# Patient Record
Sex: FEMALE | Race: WHITE | NOT HISPANIC OR LATINO | Employment: FULL TIME | URBAN - METROPOLITAN AREA
[De-identification: names, ages, dates, MRNs, and addresses within clinical notes are randomized per-mention and may not be internally consistent; named-entity substitution may affect disease eponyms.]

---

## 2023-01-08 ENCOUNTER — TRANSFERRED RECORDS (OUTPATIENT)
Dept: HEALTH INFORMATION MANAGEMENT | Facility: CLINIC | Age: 28
End: 2023-01-08

## 2023-03-16 ENCOUNTER — TRANSFERRED RECORDS (OUTPATIENT)
Dept: HEALTH INFORMATION MANAGEMENT | Facility: CLINIC | Age: 28
End: 2023-03-16

## 2023-03-29 ENCOUNTER — TRANSFERRED RECORDS (OUTPATIENT)
Dept: HEALTH INFORMATION MANAGEMENT | Facility: CLINIC | Age: 28
End: 2023-03-29

## 2023-05-04 ENCOUNTER — TRANSFERRED RECORDS (OUTPATIENT)
Dept: HEALTH INFORMATION MANAGEMENT | Facility: CLINIC | Age: 28
End: 2023-05-04

## 2024-09-06 ENCOUNTER — OFFICE VISIT (OUTPATIENT)
Dept: FAMILY MEDICINE | Facility: CLINIC | Age: 29
End: 2024-09-06
Payer: COMMERCIAL

## 2024-09-06 VITALS
TEMPERATURE: 97.1 F | BODY MASS INDEX: 26.71 KG/M2 | HEART RATE: 79 BPM | RESPIRATION RATE: 11 BRPM | WEIGHT: 160.3 LBS | OXYGEN SATURATION: 100 % | SYSTOLIC BLOOD PRESSURE: 118 MMHG | HEIGHT: 65 IN | DIASTOLIC BLOOD PRESSURE: 76 MMHG

## 2024-09-06 DIAGNOSIS — F50.9 EATING DISORDER, UNSPECIFIED TYPE: ICD-10-CM

## 2024-09-06 DIAGNOSIS — F33.2 SEVERE RECURRENT MAJOR DEPRESSION WITHOUT PSYCHOTIC FEATURES (H): ICD-10-CM

## 2024-09-06 DIAGNOSIS — K21.9 GASTROESOPHAGEAL REFLUX DISEASE WITHOUT ESOPHAGITIS: Primary | ICD-10-CM

## 2024-09-06 PROCEDURE — 99214 OFFICE O/P EST MOD 30 MIN: CPT | Performed by: FAMILY MEDICINE

## 2024-09-06 PROCEDURE — 96127 BRIEF EMOTIONAL/BEHAV ASSMT: CPT | Performed by: FAMILY MEDICINE

## 2024-09-06 RX ORDER — ESCITALOPRAM OXALATE 10 MG/1
10 TABLET ORAL DAILY
Qty: 90 TABLET | Refills: 0 | Status: SHIPPED | OUTPATIENT
Start: 2024-09-06

## 2024-09-06 RX ORDER — TESTOSTERONE 20.25 MG/1.25G
2 GEL TOPICAL DAILY
COMMUNITY
Start: 2024-08-29

## 2024-09-06 RX ORDER — ESOMEPRAZOLE MAGNESIUM 40 MG/1
40 CAPSULE, DELAYED RELEASE ORAL
Qty: 90 CAPSULE | Refills: 0 | Status: SHIPPED | OUTPATIENT
Start: 2024-09-06

## 2024-09-06 ASSESSMENT — PAIN SCALES - GENERAL: PAINLEVEL: MILD PAIN (3)

## 2024-09-06 ASSESSMENT — PATIENT HEALTH QUESTIONNAIRE - PHQ9
10. IF YOU CHECKED OFF ANY PROBLEMS, HOW DIFFICULT HAVE THESE PROBLEMS MADE IT FOR YOU TO DO YOUR WORK, TAKE CARE OF THINGS AT HOME, OR GET ALONG WITH OTHER PEOPLE: EXTREMELY DIFFICULT
SUM OF ALL RESPONSES TO PHQ QUESTIONS 1-9: 21
SUM OF ALL RESPONSES TO PHQ QUESTIONS 1-9: 21

## 2024-09-06 NOTE — PROGRESS NOTES
"Preventive Care Visit  [facility]  ROSA GARCIA MD, Family Medicine  Sep 6, 2024    Assessment & Plan   Gastroesophageal reflux disease without esophagitis  - esomeprazole (NEXIUM) 40 MG DR capsule  Dispense: 90 capsule; Refill: 0  Given [email or web address] heartburn information for recommendations, such as elevating the head of the bed by 6-8 inches.    Severe recurrent major depression without psychotic features (H)  - escitalopram (LEXAPRO) 10 MG tablet  Dispense: 90 tablet; Refill: 0  - Adult Mental Health [facility] Referral    Eating disorder, unspecified type  Offered referral to a dietician. She declines at this time.  They will try to eat the variety that is palatable to her.    They agree to return in 3-6 weeks for preventative care and recheck.  BMI  Estimated body mass index is 26.68 kg/m  as calculated from the following:    Height as of this encounter: 1.651 m (5' 5\").    Weight as of this encounter: 72.7 kg (160 lb 4.8 oz).         9/6/2024     1:40 PM   PHQ   PHQ-9 Total Score 21   Q9: Thoughts of better off dead/self-harm past 2 weeks More than half the days   F/U: Thoughts of suicide or self-harm No   F/U: Safety concerns No     Follow Up Actions Taken  Crisis resource information provided in the After Visit Summary  Patient to follow up with PCP.  Clinic staff to schedule appointment if able.    Discussed the following ways the patient can remain in a safe environment:  be around others and they and their partner say there is no alcohol, drugs or guns in their home.  Richard Bustillo is a 29 year old, presenting for the following:  Physical and Establish Care        9/6/2024     1:54 PM   Additional Questions   Accompanied by ham      Health Care Directive  Patient does not have a Health Care Directive or Living Will: Discussed advance care planning with patient; information given to patient to review.    Attempted suicide over a decade ago. Tried pills, tried cutting. " Parents didn't find out. At 12 yo spent a weekend in a psych bashir after saying he was suicidal.  Self diagnoses: Social anxiety and PTSD and neurodivergent. Interested in counseling.  Stress just got a job. This is the second full time job he has had. Likes the job, but it is mentally exhausted at the end. 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 But all his energy goes to work.  Struggles with sleep. Has blackout curtains, but needs sound to sleep. Hard to fall asleep if he knows he has to get up in the morning. Tiny amount of melatonin.  Diagnosed with GERD. Was on a strong antacid. Using an OTC esmoprazole. Regurgitates. Limits foods and when he eats.  Struggles to eat vegetables. Bitter and earthy things are hard to eat. Eats corn. Likes peas and green beans. It is a taste and texture issue. Eats balogne sandwiches on white bread without condiments daily. Can drink milk. Does not drink soda often. Drinks water.          9/6/2024   General Health   How would you rate your overall physical health? (!) POOR   Feel stress (tense, anxious, or unable to sleep) Very much      (!) STRESS CONCERN      9/6/2024   Nutrition   Three or more servings of calcium each day? (!) NO   Diet: Regular (no restrictions)   How many servings of fruit and vegetables per day? (!) 0-1   How many sweetened beverages each day? 0-1          9/6/2024   Exercise   Days per week of moderate/strenous exercise 5 days   Average minutes spent exercising at this level Patient declined          9/6/2024   Social Factors   Frequency of gathering with friends or relatives Never   Worry food won't last until get money to buy more No   Food not last or not have enough money for food? No   Do you have housing? (Housing is defined as stable permanent housing and does not include staying ouside in a car, in a tent, in an abandoned building, in an overnight shelter, or couch-surfing.) Yes   Are you worried about losing your housing? No   Lack  "of transportation? No   Unable to get utilities (heat,electricity)? No      (!) SOCIAL CONNECTIONS CONCERN      9/6/2024   Dental   Dentist two times every year? (!) NO          9/6/2024   TB Screening   Were you born outside of the [place]? No      Today's PHQ-9 Score:       9/6/2024     1:40 PM   PHQ-9 SCORE   PHQ-9 Total Score MyChart 21 (Severe depression)   PHQ-9 Total Score 21         9/6/2024   Substance Use   Alcohol more than 3/day or more than 7/wk No   Do you use any other substances recreationally? No      Social History     Tobacco Use    Smoking status: Never    Smokeless tobacco: Never   Vaping Use    Vaping status: Never Used              9/6/2024   STI Screening   New sexual partner(s) since last STI/HIV test? No          9/6/2024   Contraception/Family Planning   Questions about contraception or family planning No       Reviewed and updated as needed this visit by Provider                History reviewed. No pertinent past medical history.  History reviewed. No pertinent surgical history.  OB History   No obstetric history on file.     Current Outpatient Medications   Medication Sig Dispense Refill    escitalopram (LEXAPRO) 10 MG tablet Take 1 tablet (10 mg) by mouth daily. 90 tablet 0    esomeprazole (NEXIUM) 40 MG DR capsule Take 1 capsule (40 mg) by mouth every morning (before breakfast). Take 30-60 minutes before eating. 90 capsule 0    Testosterone 1.62 % GEL Apply 2 Pump topically daily.       Allergies   Allergen Reactions    Penicillins Other (See Comments)      Objective    Exam  /76 (BP Location: Left arm, Patient Position: Sitting, Cuff Size: Adult Large)   Pulse 79   Temp 97.1  F (36.2  C)   Resp 11   Ht 1.651 m (5' 5\")   Wt 72.7 kg (160 lb 4.8 oz)   LMP  (LMP Unknown)   SpO2 100%   BMI 26.68 kg/m     Estimated body mass index is 26.68 kg/m  as calculated from the following:    Height as of this encounter: 1.651 m (5' 5\").    Weight as of this encounter: 72.7 kg (160 lb 4.8 " oz).    Physical Exam  GENERAL: healthy, alert and no distress  EYES: grossly normal to inspection, and conjunctivae and sclerae normal  RESP: breathing unlabored, no cough or throat clearing.  MS: no gross musculoskeletal defects noted.  SKIN: no suspicious lesions or rashes visible  NEURO: Normal strength and tone, mentation intact and speech normal  PSYCH: mentation appears normal, affect normal/bright   Signed Electronically by: ROSA GARICA MD

## 2024-10-06 ENCOUNTER — HEALTH MAINTENANCE LETTER (OUTPATIENT)
Age: 29
End: 2024-10-06

## 2024-10-18 ENCOUNTER — OFFICE VISIT (OUTPATIENT)
Dept: FAMILY MEDICINE | Facility: CLINIC | Age: 29
End: 2024-10-18
Payer: COMMERCIAL

## 2024-10-18 VITALS
HEART RATE: 74 BPM | OXYGEN SATURATION: 99 % | WEIGHT: 161.4 LBS | RESPIRATION RATE: 16 BRPM | HEIGHT: 65 IN | TEMPERATURE: 98.3 F | BODY MASS INDEX: 26.89 KG/M2 | SYSTOLIC BLOOD PRESSURE: 120 MMHG | DIASTOLIC BLOOD PRESSURE: 54 MMHG

## 2024-10-18 DIAGNOSIS — F33.2 SEVERE RECURRENT MAJOR DEPRESSION WITHOUT PSYCHOTIC FEATURES (H): Primary | ICD-10-CM

## 2024-10-18 DIAGNOSIS — Z13.29 SCREENING FOR ENDOCRINE, NUTRITIONAL, METABOLIC AND IMMUNITY DISORDER: ICD-10-CM

## 2024-10-18 DIAGNOSIS — Z11.59 NEED FOR HEPATITIS C SCREENING TEST: ICD-10-CM

## 2024-10-18 DIAGNOSIS — Z13.228 SCREENING FOR ENDOCRINE, NUTRITIONAL, METABOLIC AND IMMUNITY DISORDER: ICD-10-CM

## 2024-10-18 DIAGNOSIS — Z11.4 SCREENING FOR HIV (HUMAN IMMUNODEFICIENCY VIRUS): ICD-10-CM

## 2024-10-18 DIAGNOSIS — F84.0 AUTISM: ICD-10-CM

## 2024-10-18 DIAGNOSIS — Z13.0 SCREENING FOR ENDOCRINE, NUTRITIONAL, METABOLIC AND IMMUNITY DISORDER: ICD-10-CM

## 2024-10-18 DIAGNOSIS — Z13.21 SCREENING FOR ENDOCRINE, NUTRITIONAL, METABOLIC AND IMMUNITY DISORDER: ICD-10-CM

## 2024-10-18 LAB
ALBUMIN SERPL BCG-MCNC: 4.4 G/DL (ref 3.5–5.2)
ALP SERPL-CCNC: 105 U/L (ref 40–150)
ALT SERPL W P-5'-P-CCNC: 27 U/L (ref 0–70)
ANION GAP SERPL CALCULATED.3IONS-SCNC: 9 MMOL/L (ref 7–15)
AST SERPL W P-5'-P-CCNC: 26 U/L (ref 0–45)
BASOPHILS # BLD AUTO: 0 10E3/UL (ref 0–0.2)
BASOPHILS NFR BLD AUTO: 0 %
BILIRUB SERPL-MCNC: 0.3 MG/DL
BUN SERPL-MCNC: 10.9 MG/DL (ref 6–20)
CALCIUM SERPL-MCNC: 9.6 MG/DL (ref 8.8–10.4)
CHLORIDE SERPL-SCNC: 107 MMOL/L (ref 98–107)
CHOLEST SERPL-MCNC: 162 MG/DL
CREAT SERPL-MCNC: 0.8 MG/DL (ref 0.51–1.17)
EGFRCR SERPLBLD CKD-EPI 2021: >90 ML/MIN/1.73M2
EOSINOPHIL # BLD AUTO: 0.2 10E3/UL (ref 0–0.7)
EOSINOPHIL NFR BLD AUTO: 2 %
ERYTHROCYTE [DISTWIDTH] IN BLOOD BY AUTOMATED COUNT: 12.6 % (ref 10–15)
FASTING STATUS PATIENT QL REPORTED: NORMAL
FASTING STATUS PATIENT QL REPORTED: NORMAL
GLUCOSE SERPL-MCNC: 90 MG/DL (ref 70–99)
HBV SURFACE AB SERPL IA-ACNC: 118 M[IU]/ML
HBV SURFACE AB SERPL IA-ACNC: REACTIVE M[IU]/ML
HCO3 SERPL-SCNC: 25 MMOL/L (ref 22–29)
HCT VFR BLD AUTO: 44.7 % (ref 35–53)
HDLC SERPL-MCNC: 44 MG/DL
HGB BLD-MCNC: 14.3 G/DL (ref 11.7–17.7)
IMM GRANULOCYTES # BLD: 0 10E3/UL
IMM GRANULOCYTES NFR BLD: 0 %
LDLC SERPL CALC-MCNC: 92 MG/DL
LYMPHOCYTES # BLD AUTO: 3.6 10E3/UL (ref 0.8–5.3)
LYMPHOCYTES NFR BLD AUTO: 34 %
MCH RBC QN AUTO: 29.4 PG (ref 26.5–33)
MCHC RBC AUTO-ENTMCNC: 32 G/DL (ref 31.5–36.5)
MCV RBC AUTO: 92 FL (ref 78–100)
MONOCYTES # BLD AUTO: 1.1 10E3/UL (ref 0–1.3)
MONOCYTES NFR BLD AUTO: 10 %
NEUTROPHILS # BLD AUTO: 5.6 10E3/UL (ref 1.6–8.3)
NEUTROPHILS NFR BLD AUTO: 54 %
NONHDLC SERPL-MCNC: 118 MG/DL
PLATELET # BLD AUTO: 255 10E3/UL (ref 150–450)
POTASSIUM SERPL-SCNC: 3.9 MMOL/L (ref 3.4–5.3)
PROT SERPL-MCNC: 6.9 G/DL (ref 6.4–8.3)
RBC # BLD AUTO: 4.87 10E6/UL (ref 3.8–5.9)
SODIUM SERPL-SCNC: 141 MMOL/L (ref 135–145)
T4 FREE SERPL-MCNC: 1.11 NG/DL (ref 0.9–1.7)
TRIGL SERPL-MCNC: 129 MG/DL
TSH SERPL DL<=0.005 MIU/L-ACNC: 5.91 UIU/ML (ref 0.3–4.2)
WBC # BLD AUTO: 10.4 10E3/UL (ref 4–11)

## 2024-10-18 PROCEDURE — 84443 ASSAY THYROID STIM HORMONE: CPT | Performed by: FAMILY MEDICINE

## 2024-10-18 PROCEDURE — 99214 OFFICE O/P EST MOD 30 MIN: CPT | Mod: 25 | Performed by: FAMILY MEDICINE

## 2024-10-18 PROCEDURE — 90471 IMMUNIZATION ADMIN: CPT | Performed by: FAMILY MEDICINE

## 2024-10-18 PROCEDURE — 85025 COMPLETE CBC W/AUTO DIFF WBC: CPT | Performed by: FAMILY MEDICINE

## 2024-10-18 PROCEDURE — 80053 COMPREHEN METABOLIC PANEL: CPT | Performed by: FAMILY MEDICINE

## 2024-10-18 PROCEDURE — 91320 SARSCV2 VAC 30MCG TRS-SUC IM: CPT | Performed by: FAMILY MEDICINE

## 2024-10-18 PROCEDURE — 90715 TDAP VACCINE 7 YRS/> IM: CPT | Performed by: FAMILY MEDICINE

## 2024-10-18 PROCEDURE — 90472 IMMUNIZATION ADMIN EACH ADD: CPT | Performed by: FAMILY MEDICINE

## 2024-10-18 PROCEDURE — 80061 LIPID PANEL: CPT | Performed by: FAMILY MEDICINE

## 2024-10-18 PROCEDURE — 84439 ASSAY OF FREE THYROXINE: CPT | Performed by: FAMILY MEDICINE

## 2024-10-18 PROCEDURE — 90651 9VHPV VACCINE 2/3 DOSE IM: CPT | Performed by: FAMILY MEDICINE

## 2024-10-18 PROCEDURE — 86706 HEP B SURFACE ANTIBODY: CPT | Performed by: FAMILY MEDICINE

## 2024-10-18 PROCEDURE — 36415 COLL VENOUS BLD VENIPUNCTURE: CPT | Performed by: FAMILY MEDICINE

## 2024-10-18 PROCEDURE — 90480 ADMN SARSCOV2 VAC 1/ONLY CMP: CPT | Performed by: FAMILY MEDICINE

## 2024-10-18 RX ORDER — VENLAFAXINE HYDROCHLORIDE 37.5 MG/1
37.5 CAPSULE, EXTENDED RELEASE ORAL DAILY
Qty: 30 CAPSULE | Refills: 0 | Status: SHIPPED | OUTPATIENT
Start: 2024-10-18

## 2024-10-18 ASSESSMENT — ANXIETY QUESTIONNAIRES
4. TROUBLE RELAXING: NEARLY EVERY DAY
3. WORRYING TOO MUCH ABOUT DIFFERENT THINGS: NEARLY EVERY DAY
7. FEELING AFRAID AS IF SOMETHING AWFUL MIGHT HAPPEN: SEVERAL DAYS
6. BECOMING EASILY ANNOYED OR IRRITABLE: MORE THAN HALF THE DAYS
GAD7 TOTAL SCORE: 16
8. IF YOU CHECKED OFF ANY PROBLEMS, HOW DIFFICULT HAVE THESE MADE IT FOR YOU TO DO YOUR WORK, TAKE CARE OF THINGS AT HOME, OR GET ALONG WITH OTHER PEOPLE?: EXTREMELY DIFFICULT
1. FEELING NERVOUS, ANXIOUS, OR ON EDGE: NEARLY EVERY DAY
GAD7 TOTAL SCORE: 16
IF YOU CHECKED OFF ANY PROBLEMS ON THIS QUESTIONNAIRE, HOW DIFFICULT HAVE THESE PROBLEMS MADE IT FOR YOU TO DO YOUR WORK, TAKE CARE OF THINGS AT HOME, OR GET ALONG WITH OTHER PEOPLE: EXTREMELY DIFFICULT
5. BEING SO RESTLESS THAT IT IS HARD TO SIT STILL: SEVERAL DAYS
7. FEELING AFRAID AS IF SOMETHING AWFUL MIGHT HAPPEN: SEVERAL DAYS
GAD7 TOTAL SCORE: 16
2. NOT BEING ABLE TO STOP OR CONTROL WORRYING: NEARLY EVERY DAY

## 2024-10-18 NOTE — PROGRESS NOTES
"Assessment & Plan   The longitudinal plan of care for the diagnosis(es)/condition(s) as documented were addressed during this visit. Due to the added complexity in care, I will continue to support Keny in the subsequent management and with ongoing continuity of care.  Severe recurrent major depression without psychotic features (H)  Side effects to escitalopram  - venlafaxine (EFFEXOR XR) 37.5 MG 24 hr capsule  Dispense: 30 capsule; Refill: 0    Autism  Per drugs.com, venlafaxine was preferred by patients.  - venlafaxine (EFFEXOR XR) 37.5 MG 24 hr capsule  Dispense: 30 capsule; Refill: 0    Screening for endocrine, nutritional, metabolic and immunity disorder  - Comprehensive metabolic panel (BMP + Alb, Alk Phos, ALT, AST, Total. Bili, TP)  - CBC with platelets and differential  - TSH with free T4 reflex  - Lipid Profile (Chol, Trig, HDL, LDL calc)  - Hepatitis B Surface Antibody  - T4 free    BMI  Estimated body mass index is 26.86 kg/m  as calculated from the following:    Height as of this encounter: 1.651 m (5' 5\").    Weight as of this encounter: 73.2 kg (161 lb 6.4 oz).     Subjective   Keny is a 29 year old, presenting for the following health issues:  Follow Up      10/18/2024    12:13 PM   Additional Questions   Roomed by LUANN Roberto     Took it for two days and stayed up for three days. The anxiety and depression was worse than ever. When she stopped it, she had a crashing depression. Better now.  She had never been on any antidepressants before.  It has been a long time since she stayed up for days.  \"To me it is normal. It is a lot of anxiety. Social anxiety, generalized anxiety.\" Does not ask for what is needed. Partner states they can't do things.    History of Present Illness       Mental Health Follow-up:  Patient presents to follow-up on Depression & Anxiety.Patient's depression since last visit has been:  Worse  The patient is not having other symptoms associated with depression.  Patient's " "anxiety since last visit has been:  Worse  The patient is not having other symptoms associated with anxiety.  Any significant life events: other (Actually all-- partner's health, financial and job concerns.)  Patient is feeling anxious or having panic attacks.  Patient has no concerns about alcohol or drug use.    Keny eats 0-1 servings of fruits and vegetables daily.Keny consumes 0 sweetened beverage(s) daily.Keny exercises with enough effort to increase Keny's heart rate 60 or more minutes per day.  Keny exercises with enough effort to increase Keny's heart rate 5 days per week.   Keny is taking medications regularly.           Objective    /54 (BP Location: Left arm, Patient Position: Sitting, Cuff Size: Adult Regular)   Pulse 74   Temp 98.3  F (36.8  C) (Oral)   Resp 16   Ht 1.651 m (5' 5\")   Wt 73.2 kg (161 lb 6.4 oz)   LMP  (LMP Unknown)   SpO2 99%   BMI 26.86 kg/m    Body mass index is 26.86 kg/m .  Physical Exam               Signed Electronically by: ROSA GARCIA MD    "

## 2024-11-25 ENCOUNTER — OFFICE VISIT (OUTPATIENT)
Dept: FAMILY MEDICINE | Facility: CLINIC | Age: 29
End: 2024-11-25
Payer: COMMERCIAL

## 2024-11-25 ENCOUNTER — ANCILLARY PROCEDURE (OUTPATIENT)
Dept: GENERAL RADIOLOGY | Facility: CLINIC | Age: 29
End: 2024-11-25
Payer: COMMERCIAL

## 2024-11-25 VITALS
OXYGEN SATURATION: 100 % | BODY MASS INDEX: 27.49 KG/M2 | WEIGHT: 165 LBS | HEART RATE: 73 BPM | HEIGHT: 65 IN | TEMPERATURE: 98 F | SYSTOLIC BLOOD PRESSURE: 112 MMHG | DIASTOLIC BLOOD PRESSURE: 66 MMHG | RESPIRATION RATE: 14 BRPM

## 2024-11-25 DIAGNOSIS — G89.29 CHRONIC PAIN OF RIGHT LOWER EXTREMITY: ICD-10-CM

## 2024-11-25 DIAGNOSIS — F33.2 SEVERE RECURRENT MAJOR DEPRESSION WITHOUT PSYCHOTIC FEATURES (H): ICD-10-CM

## 2024-11-25 DIAGNOSIS — K21.9 GASTROESOPHAGEAL REFLUX DISEASE WITHOUT ESOPHAGITIS: ICD-10-CM

## 2024-11-25 DIAGNOSIS — M79.604 CHRONIC PAIN OF RIGHT LOWER EXTREMITY: ICD-10-CM

## 2024-11-25 DIAGNOSIS — Z11.59 NEED FOR HEPATITIS C SCREENING TEST: ICD-10-CM

## 2024-11-25 DIAGNOSIS — F50.9 EATING DISORDER, UNSPECIFIED TYPE: ICD-10-CM

## 2024-11-25 DIAGNOSIS — Z11.4 SCREENING FOR HIV (HUMAN IMMUNODEFICIENCY VIRUS): Primary | ICD-10-CM

## 2024-11-25 DIAGNOSIS — R79.89 ABNORMAL TSH: ICD-10-CM

## 2024-11-25 PROCEDURE — 90472 IMMUNIZATION ADMIN EACH ADD: CPT | Performed by: FAMILY MEDICINE

## 2024-11-25 PROCEDURE — 99214 OFFICE O/P EST MOD 30 MIN: CPT | Mod: 25 | Performed by: FAMILY MEDICINE

## 2024-11-25 PROCEDURE — 90471 IMMUNIZATION ADMIN: CPT | Performed by: FAMILY MEDICINE

## 2024-11-25 PROCEDURE — 90651 9VHPV VACCINE 2/3 DOSE IM: CPT | Performed by: FAMILY MEDICINE

## 2024-11-25 PROCEDURE — 90656 IIV3 VACC NO PRSV 0.5 ML IM: CPT | Performed by: FAMILY MEDICINE

## 2024-11-25 PROCEDURE — 96127 BRIEF EMOTIONAL/BEHAV ASSMT: CPT | Performed by: FAMILY MEDICINE

## 2024-11-25 PROCEDURE — 73562 X-RAY EXAM OF KNEE 3: CPT | Mod: TC | Performed by: RADIOLOGY

## 2024-11-25 RX ORDER — ESOMEPRAZOLE MAGNESIUM 40 MG/1
40 CAPSULE, DELAYED RELEASE ORAL 2 TIMES DAILY
Qty: 90 CAPSULE | Refills: 0 | Status: SHIPPED | OUTPATIENT
Start: 2024-11-25

## 2024-11-25 ASSESSMENT — PATIENT HEALTH QUESTIONNAIRE - PHQ9
10. IF YOU CHECKED OFF ANY PROBLEMS, HOW DIFFICULT HAVE THESE PROBLEMS MADE IT FOR YOU TO DO YOUR WORK, TAKE CARE OF THINGS AT HOME, OR GET ALONG WITH OTHER PEOPLE: NOT DIFFICULT AT ALL
SUM OF ALL RESPONSES TO PHQ QUESTIONS 1-9: 0
SUM OF ALL RESPONSES TO PHQ QUESTIONS 1-9: 0

## 2024-11-25 NOTE — PROGRESS NOTES
"Assessment & Plan   Chronic pain of right lower extremity  - XR Knee Right 3 Views  - Physical Therapy  Referral    Abnormal TSH  - TSH with free T4 reflex    Gastroesophageal reflux disease without esophagitis  Not improving on Nexium. Includes cough and chest pain.  - esomeprazole (NEXIUM) 40 MG DR capsule  Dispense: 90 capsule; Refill: 0  - Adult GI  Referral - Consult Only    Severe recurrent major depression without psychotic features (H)  Will restart venlafaxine    Eating disorder, unspecified type  Eating daily.  Weight is up 4#.  The longitudinal plan of care for the diagnosis(es)/condition(s) as documented were addressed during this visit. Due to the added complexity in care, I will continue to support Keny in the subsequent management and with ongoing continuity of care.  BMI  Estimated body mass index is 27.46 kg/m  as calculated from the following:    Height as of this encounter: 1.651 m (5' 5\").    Weight as of this encounter: 74.8 kg (165 lb).     Subjective   Keny is a 29 year old, presenting for the following health issues:  Recheck Medication      11/25/2024     9:48 AM   Additional Questions   Roomed by Enrike GARAY RN   Accompanied by Significant other     Could not afford venlafaxine until  a few days ago.  Still has a persistent cough, reflux into the mouth. The stomach and chest pain is not much different. A consistent burning and chest pain. Eating worsens the cough.  They have had pain in the right knee for a couple years, since a fall onto both knees. Now there is pain in the quadriceps and shin also at times. The knee feels like it might give out at times. They are an  at [employer], so lots of kneeling and stairs and climbing. The pain was there before, but worse now. They don't use knee pads or cushions.     Review of Systems  Constitutional, HEENT, cardiovascular, pulmonary, GI, , musculoskeletal, neuro, skin, endocrine and psych systems are " "negative, except as otherwise noted.      Objective    /66 (BP Location: Left arm, Patient Position: Sitting, Cuff Size: Adult Regular)   Pulse 73   Temp 98  F (36.7  C) (Oral)   Resp 14   Ht 1.651 m (5' 5\")   Wt 74.8 kg (165 lb)   SpO2 100%   BMI 27.46 kg/m    Body mass index is 27.46 kg/m .  Physical Exam   GENERAL: alert and no distress  EYES: Eyes grossly normal to inspection, PERRL and conjunctivae and sclerae normal  HENT: ear canals and TM's normal, nose and mouth without ulcers or lesions  NECK: no adenopathy, no asymmetry, masses, or scars  RESP: lungs clear to auscultation - no rales, rhonchi or wheezes  CV: regular rate and rhythm, normal S1 S2, no S3 or S4, no murmur, click or rub, no peripheral edema  ABDOMEN: soft, nontender, no hepatosplenomegaly, no masses and bowel sounds normal  MS: no gross musculoskeletal defects noted, no edema  SKIN: no suspicious lesions or rashes  NEURO: Normal strength and tone, mentation intact and speech normal  PSYCH: mentation appears normal, affect normal/bright    Office Visit on 10/18/2024   Component Date Value Ref Range Status    Sodium 10/18/2024 141  135 - 145 mmol/L Final    Potassium 10/18/2024 3.9  3.4 - 5.3 mmol/L Final    Carbon Dioxide (CO2) 10/18/2024 25  22 - 29 mmol/L Final    Anion Gap 10/18/2024 9  7 - 15 mmol/L Final    Urea Nitrogen 10/18/2024 10.9  6.0 - 20.0 mg/dL Final    Creatinine 10/18/2024 0.80  0.51 - 1.17 mg/dL Final    Male and Female  0-2 Months    0.31-0.88 mg/dL  2-12 Months   0.16-0.39 mg/dL  1-2 Years     0.18-0.35 mg/dL  3-4 Years     0.26-0.42 mg/dL  5-6 Years     0.29-0.47 mg/dL  7-8 Years     0.34-0.53 mg/dL  9-10 Years    0.33-0.64 mg/dL  11-12 Years   0.44-0.68 mg/dL  13-14 Years   0.46-0.77 mg/dL    Female  15 Years and older  0.51-0.95 mg/dL    Male  15 Years and older  0.67-1.17 mg/dL        GFR Estimate 10/18/2024 >90  >60 mL/min/1.73m2 Final    The generation of the estimated GFR is currently based on binary male " "or female sex. If the electronic health record information indicates another gender identity or if Legal Sex is recorded as \"Unknown\", GFR estimates are not automatically calculated, and application of GFR equations or a direct GFR measurement should be considered according to the individual's appropriate clinical context.  eGFR calculated using 2021 CKD-EPI equation.    Calcium 10/18/2024 9.6  8.8 - 10.4 mg/dL Final    Reference intervals for this test were updated on 7/16/2024 to reflect our healthy population more accurately. There may be differences in the flagging of prior results with similar values performed with this method. Those prior results can be interpreted in the context of the updated reference intervals.    Chloride 10/18/2024 107  98 - 107 mmol/L Final    Glucose 10/18/2024 90  70 - 99 mg/dL Final    Alkaline Phosphatase 10/18/2024 105  40 - 150 U/L Final    Female:    0-15 days     U/L  15d-1 year   122-469 U/L  1-10 years   142-335 U/L  10-13 years  129-417 U/L  13-15 years   U/L  15-17 years   U/L  17-19 years  45-87 U/L  19 years and older   U/L      Male:  0-15 days     U/L  15d-1 year   122-469 U/L  1-10 years   142-335 U/L  10-13 years  129-417 U/L  13-15 years  116-468 U/L  15-17 years   U/L  17-19 years   U/L  19 years and older   U/L      AST 10/18/2024 26  0 - 45 U/L Final    ALT 10/18/2024 27  0 - 70 U/L Final    Female   All ages       0-50 U/L     Male   0-20 Years     0-50 U/L  20-Unsp. Years 0-70 U/L        Protein Total 10/18/2024 6.9  6.4 - 8.3 g/dL Final    Albumin 10/18/2024 4.4  3.5 - 5.2 g/dL Final    Bilirubin Total 10/18/2024 0.3  <=1.2 mg/dL Final    Patient Fasting > 8hrs? 10/18/2024 Unknown   Final    TSH 10/18/2024 5.91 (H)  0.30 - 4.20 uIU/mL Final    Cholesterol 10/18/2024 162  <200 mg/dL Final    Triglycerides 10/18/2024 129  <150 mg/dL Final    Direct Measure HDL 10/18/2024 44  >=40 mg/dL Final    LDL Cholesterol " Calculated 10/18/2024 92  <100 mg/dL Final    Non HDL Cholesterol 10/18/2024 118  <130 mg/dL Final    Patient Fasting > 8hrs? 10/18/2024 Unknown   Final    Hepatitis B Surface Antibody 10/18/2024 Reactive   Final    A reactive result indicates recovery from acute or chronic hepatitis B virus (HBV) infection or acquired immunity from HBV vaccination. This assay does not differentiate between a vaccine-induced immune response and an immune response induced by infection with HBV. A positive total antihepatitis B core result would indicate that the hepatitis B surface antibody response is due to past HBV infection.    Hepatitis B Surface Antibody Instr* 10/18/2024 118.00  <8.5 m[IU]/mL Final    WBC Count 10/18/2024 10.4  4.0 - 11.0 10e3/uL Final    RBC Count 10/18/2024 4.87  3.80 - 5.90 10e6/uL Final    Reference Range:                                                     Female 3.80-5.20 10e6/uL                                      Male 4.40-5.90 10e6u/L    Hemoglobin 10/18/2024 14.3  11.7 - 17.7 g/dL Final    Reference Range:                                                     Female 11.7-15.7 g/dL                                      Male 13.3-17.7 g/dL    Hematocrit 10/18/2024 44.7  35.0 - 53.0 % Final    Reference Range:                                                     Female 35.0-47.0 %                                            Male 40.0-54.0 %    MCV 10/18/2024 92  78 - 100 fL Final    MCH 10/18/2024 29.4  26.5 - 33.0 pg Final    MCHC 10/18/2024 32.0  31.5 - 36.5 g/dL Final    RDW 10/18/2024 12.6  10.0 - 15.0 % Final    Platelet Count 10/18/2024 255  150 - 450 10e3/uL Final    % Neutrophils 10/18/2024 54  % Final    % Lymphocytes 10/18/2024 34  % Final    % Monocytes 10/18/2024 10  % Final    % Eosinophils 10/18/2024 2  % Final    % Basophils 10/18/2024 0  % Final    % Immature Granulocytes 10/18/2024 0  % Final    Absolute Neutrophils 10/18/2024 5.6  1.6 - 8.3 10e3/uL Final    Absolute Lymphocytes 10/18/2024  3.6  0.8 - 5.3 10e3/uL Final    Absolute Monocytes 10/18/2024 1.1  0.0 - 1.3 10e3/uL Final    Absolute Eosinophils 10/18/2024 0.2  0.0 - 0.7 10e3/uL Final    Absolute Basophils 10/18/2024 0.0  0.0 - 0.2 10e3/uL Final    Absolute Immature Granulocytes 10/18/2024 0.0  <=0.4 10e3/uL Final    Free T4 10/18/2024 1.11  0.90 - 1.70 ng/dL Final   Signed Electronically by: ROSA GARCIA MD    Answers submitted by the patient for this visit:  Patient Health Questionnaire (Submitted on 11/25/2024)  If you checked off any problems, how difficult have these problems made it for you to do your work, take care of things at home, or get along with other people?: Not difficult at all  PHQ9 TOTAL SCORE: 0

## 2024-11-26 ENCOUNTER — TELEPHONE (OUTPATIENT)
Dept: GASTROENTEROLOGY | Facility: CLINIC | Age: 29
End: 2024-11-26

## 2024-12-02 ENCOUNTER — THERAPY VISIT (OUTPATIENT)
Dept: PHYSICAL THERAPY | Facility: REHABILITATION | Age: 29
End: 2024-12-02
Payer: COMMERCIAL

## 2024-12-02 DIAGNOSIS — G89.29 CHRONIC PAIN OF RIGHT LOWER EXTREMITY: Primary | ICD-10-CM

## 2024-12-02 DIAGNOSIS — M79.604 CHRONIC PAIN OF RIGHT LOWER EXTREMITY: Primary | ICD-10-CM

## 2024-12-02 PROCEDURE — 97535 SELF CARE MNGMENT TRAINING: CPT | Mod: GP | Performed by: PHYSICAL THERAPIST

## 2024-12-02 PROCEDURE — 97110 THERAPEUTIC EXERCISES: CPT | Mod: GP | Performed by: PHYSICAL THERAPIST

## 2024-12-02 PROCEDURE — 97161 PT EVAL LOW COMPLEX 20 MIN: CPT | Mod: GP | Performed by: PHYSICAL THERAPIST

## 2024-12-02 ASSESSMENT — ACTIVITIES OF DAILY LIVING (ADL)
WALK: ACTIVITY IS MINIMALLY DIFFICULT
GO DOWN STAIRS: ACTIVITY IS SOMEWHAT DIFFICULT
PUTTING_ON_A_SHIRT_THAT_BUTTONS_DOWN_THE_FRONT: 0
KNEE_ACTIVITY_OF_DAILY_LIVING_SCORE: 65.71
AS_A_RESULT_OF_YOUR_KNEE_INJURY,_HOW_WOULD_YOU_RATE_YOUR_CURRENT_LEVEL_OF_DAILY_ACTIVITY?: NORMAL
STIFFNESS: I HAVE THE SYMPTOM BUT IT DOES NOT AFFECT MY ACTIVITY
WASHING_YOUR_HAIR?: 0
KNEE_ACTIVITY_OF_DAILY_LIVING_SUM: 46
PUSHING_WITH_THE_INVOLVED_ARM: 2
HOW_WOULD_YOU_RATE_THE_OVERALL_FUNCTION_OF_YOUR_KNEE_DURING_YOUR_USUAL_DAILY_ACTIVITIES?: NEARLY NORMAL
SIT WITH YOUR KNEE BENT: ACTIVITY IS MINIMALLY DIFFICULT
RISE FROM A CHAIR: ACTIVITY IS MINIMALLY DIFFICULT
SIT WITH YOUR KNEE BENT: ACTIVITY IS MINIMALLY DIFFICULT
PUTTING_ON_YOUR_PANTS: 0
PAIN: THE SYMPTOM AFFECTS MY ACTIVITY MODERATELY
PLEASE_INDICATE_YOR_PRIMARY_REASON_FOR_REFERRAL_TO_THERAPY:: KNEE
WHEN_LYING_ON_THE_INVOLVED_SIDE: 3
PAIN: THE SYMPTOM AFFECTS MY ACTIVITY MODERATELY
GIVING WAY, BUCKLING OR SHIFTING OF KNEE: THE SYMPTOM AFFECTS MY ACTIVITY SLIGHTLY
CARRYING_A_HEAVY_OBJECT_OF_10_POUNDS: 2
SWELLING: I DO NOT HAVE THE SYMPTOM
PUTTING_ON_AN_UNDERSHIRT_OR_A_PULLOVER_SWEATER: 0
STIFFNESS: I HAVE THE SYMPTOM BUT IT DOES NOT AFFECT MY ACTIVITY
AS_A_RESULT_OF_YOUR_KNEE_INJURY,_HOW_WOULD_YOU_RATE_YOUR_CURRENT_LEVEL_OF_DAILY_ACTIVITY?: NORMAL
HOW_WOULD_YOU_RATE_THE_CURRENT_FUNCTION_OF_YOUR_KNEE_DURING_YOUR_USUAL_DAILY_ACTIVITIES_ON_A_SCALE_FROM_0_TO_100_WITH_100_BEING_YOUR_LEVEL_OF_KNEE_FUNCTION_PRIOR_TO_YOUR_INJURY_AND_0_BEING_THE_INABILITY_TO_PERFORM_ANY_OF_YOUR_USUAL_DAILY_ACTIVITIES?: 85
STAND: ACTIVITY IS MINIMALLY DIFFICULT
GO UP STAIRS: ACTIVITY IS SOMEWHAT DIFFICULT
WEAKNESS: THE SYMPTOM AFFECTS MY ACTIVITY SLIGHTLY
PLACING_AN_OBJECT_ON_A_HIGH_SHELF: 1
RISE FROM A CHAIR: ACTIVITY IS MINIMALLY DIFFICULT
WEAKNESS: THE SYMPTOM AFFECTS MY ACTIVITY SLIGHTLY
REACHING_FOR_SOMETHING_ON_A_HIGH_SHELF: 1
REMOVING_SOMETHING_FROM_YOUR_BACK_POCKET: 0
SWELLING: I DO NOT HAVE THE SYMPTOM
GIVING WAY, BUCKLING OR SHIFTING OF KNEE: THE SYMPTOM AFFECTS MY ACTIVITY SLIGHTLY
HOW_WOULD_YOU_RATE_THE_CURRENT_FUNCTION_OF_YOUR_KNEE_DURING_YOUR_USUAL_DAILY_ACTIVITIES_ON_A_SCALE_FROM_0_TO_100_WITH_100_BEING_YOUR_LEVEL_OF_KNEE_FUNCTION_PRIOR_TO_YOUR_INJURY_AND_0_BEING_THE_INABILITY_TO_PERFORM_ANY_OF_YOUR_USUAL_DAILY_ACTIVITIES?: 85
WALK: ACTIVITY IS MINIMALLY DIFFICULT
SQUAT: ACTIVITY IS SOMEWHAT DIFFICULT
KNEEL ON THE FRONT OF YOUR KNEE: ACTIVITY IS FAIRLY DIFFICULT
TOUCHING_THE_BACK_OF_YOUR_NECK: 1
STAND: ACTIVITY IS MINIMALLY DIFFICULT
LIMPING: THE SYMPTOM AFFECTS MY ACTIVITY MODERATELY
GO UP STAIRS: ACTIVITY IS SOMEWHAT DIFFICULT
LIMPING: THE SYMPTOM AFFECTS MY ACTIVITY MODERATELY
SQUAT: ACTIVITY IS SOMEWHAT DIFFICULT
GO DOWN STAIRS: ACTIVITY IS SOMEWHAT DIFFICULT
PLEASE_INDICATE_YOR_PRIMARY_REASON_FOR_REFERRAL_TO_THERAPY:: SHOULDER
AT_ITS_WORST?: 5
RAW_SCORE: 46
WASHING_YOUR_BACK: 4
KNEEL ON THE FRONT OF YOUR KNEE: ACTIVITY IS FAIRLY DIFFICULT
HOW_WOULD_YOU_RATE_THE_OVERALL_FUNCTION_OF_YOUR_KNEE_DURING_YOUR_USUAL_DAILY_ACTIVITIES?: NEARLY NORMAL

## 2024-12-02 NOTE — PROGRESS NOTES
"PHYSICAL THERAPY EVALUATION  Type of Visit: Evaluation              Subjective  Patient is having right knee pain, worse on days where they walk a lot at work, at times is limping due to pain. Has pain from toe to hip on right side. At times restless while sitting, cracks if stretching knee out. Been going on a few years, worsened over time. Was living in house with hardwood floors and fell onto both knees, having problems since then. About 2 years ago started noticing pain in right foot up to right hip. Also has \"bad left shoulder\" and was avoiding sleeping on left side, and possibly contributes to right hip pain.     Is unsure what's causing pain. Notices at times during increased pain, and patient stops in place, will notice right leg is externally rotated and wonders if this is contributing to pain.     Pain management, used to walk with a cane (currently doesn't own one, and felt very limiting in mobility), pain killers occasionally.     Hurts more to go up/down stairs, as well as getting up from squatting.         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[employer], works full time  Hobbies/Interests:      Patient goals for therapy:      Pain assessment: Location: right anterior knee/Rating: at best 0/10 which is rare, 2/10 currently, at worst 5/10     Objective   LUMBAR SPINE EVALUATION  PAIN:   INTEGUMENTARY (edema, incisions):   POSTURE:   GAIT:   Weightbearing Status:   Assistive Device(s):   Gait " Deviations: Antalgic  Stance time decreased  BALANCE/PROPRIOCEPTION: Single Leg Stance Eyes Open (seconds): < 10 seconds B  WEIGHTBEARING ALIGNMENT:   NON-WEIGHTBEARING ALIGNMENT:    ROM:   (Degrees) Left AROM Left PROM  Right AROM Right PROM   Hip Flexion  WFL  WFL   Hip Extension       Hip Abduction       Hip Adduction       Hip Internal Rotation  WFL  WFL   Hip External Rotation  WFL  WFL   Knee Flexion       Knee Extension       Lumbar Side glide     Lumbar Flexion Limited by 25%, bilateral hamstring and calf tightness more pronounced on right   Lumbar Extension Limited by 25% right knee pain   Pain:   End feel:   PELVIC/SI SCREEN:   STRENGTH:     MYOTOMES:    Left Right   T12-L3 (Hip Flexion) 4+ 4+, ++ (mod)   L2-4 (Quads)  4+ 4+, ++ (mod)   L4 (Ankle DF) 5 5   L5 (Great Toe Ext) 4+ 4+   S1 (Toe Raise) 5 5     DTR S:    Left Right   C5 (Biceps)     C6 (Brachioradialis)     C7 (Triceps)     L4 (Quad) 3 3   S1 (Achilles) 3 3     CORD SIGNS: Babinski negative L, Babinski negative R  DERMATOMES:   NEURAL TENSION: Lumbar WNL  FLEXIBILITY:  hamstring tightness  LUMBAR/HIP Special Tests:    PELVIS/SI SPECIAL TESTS:   FUNCTIONAL TESTS:   PALPATION:  tenderness right patellar tendon, right patellar fat pad  SPINAL SEGMENTAL CONCLUSIONS:       Assessment & Plan   CLINICAL IMPRESSIONS  Medical Diagnosis: M79.604, G89.29 (ICD-10-CM) - Chronic pain of right lower extremity    Treatment Diagnosis: right knee pain with muscle power deficits   Impression/Assessment: Patient is a 29 year old adult with right anterior knee pain complaints.  The following significant findings have been identified: Pain, Decreased strength, Impaired balance, Impaired gait, Impaired muscle performance, and Decreased activity tolerance. These impairments interfere with their ability to perform self care tasks, work tasks, household mobility, and community mobility as compared to previous level of function. Signs/symptoms most consistent with  patellar tendinopathy and patellar fat pad syndrome. Patient will benefit from skilled physical therapy to address impairments and functional limitations in order to achieve their goals.       Clinical Decision Making (Complexity):  Clinical Presentation: Stable/Uncomplicated  Clinical Presentation Rationale: based on medical and personal factors listed in PT evaluation  Clinical Decision Making (Complexity): Low complexity    PLAN OF CARE  Treatment Interventions:  Modalities: Iontophoresis  Interventions: Gait Training, Manual Therapy, Neuromuscular Re-education, Therapeutic Activity, Therapeutic Exercise, Self-Care/Home Management    Long Term Goals     PT Goal 1  Goal Identifier: HEP  Goal Description: Patient will demonstrate at least 50% compliance in their HEP to support progress towards functional and patient specific goals  Target Date: 02/24/25  PT Goal 2  Goal Identifier: walking  Goal Description: Patient will navigate stairs reciprocally without pain to restore functional mobility      Frequency of Treatment: every other week  Duration of Treatment: 12 weeks    Recommended Referrals to Other Professionals:   Education Assessment:   Learner/Method: Patient    Risks and benefits of evaluation/treatment have been explained.   Patient/Family/caregiver agrees with Plan of Care.     Evaluation Time:     PT Eval, Low Complexity Minutes (71676): 20       Signing Clinician: Jamar Vásquez PT

## 2024-12-12 NOTE — TELEPHONE ENCOUNTER
Clinic Navigator sent a [employer] message to inform the Pt that Pt is on the wait list for a sooner appointment. Clinic Navigator will reach out to the Pt via phone call or [employer] when a sooner appointment becomes available.

## 2025-01-03 ENCOUNTER — ANCILLARY PROCEDURE (OUTPATIENT)
Dept: GENERAL RADIOLOGY | Facility: CLINIC | Age: 30
End: 2025-01-03
Attending: FAMILY MEDICINE
Payer: COMMERCIAL

## 2025-01-03 ENCOUNTER — OFFICE VISIT (OUTPATIENT)
Dept: FAMILY MEDICINE | Facility: CLINIC | Age: 30
End: 2025-01-03
Payer: COMMERCIAL

## 2025-01-03 VITALS
SYSTOLIC BLOOD PRESSURE: 129 MMHG | BODY MASS INDEX: 28.32 KG/M2 | HEIGHT: 64 IN | WEIGHT: 165.9 LBS | HEART RATE: 70 BPM | TEMPERATURE: 98.1 F | DIASTOLIC BLOOD PRESSURE: 82 MMHG | OXYGEN SATURATION: 100 %

## 2025-01-03 DIAGNOSIS — F84.0 AUTISM: ICD-10-CM

## 2025-01-03 DIAGNOSIS — F64.0 TRANSGENDER MAN ON HORMONE THERAPY: ICD-10-CM

## 2025-01-03 DIAGNOSIS — M25.512 CHRONIC LEFT SHOULDER PAIN: ICD-10-CM

## 2025-01-03 DIAGNOSIS — G89.29 CHRONIC LEFT SHOULDER PAIN: ICD-10-CM

## 2025-01-03 DIAGNOSIS — F33.2 SEVERE RECURRENT MAJOR DEPRESSION WITHOUT PSYCHOTIC FEATURES (H): ICD-10-CM

## 2025-01-03 DIAGNOSIS — F41.1 GAD (GENERALIZED ANXIETY DISORDER): ICD-10-CM

## 2025-01-03 DIAGNOSIS — F50.9 EATING DISORDER, UNSPECIFIED TYPE: Primary | ICD-10-CM

## 2025-01-03 DIAGNOSIS — R35.0 URINARY FREQUENCY: ICD-10-CM

## 2025-01-03 DIAGNOSIS — R20.2 NUMBNESS AND TINGLING OF BOTH UPPER EXTREMITIES: ICD-10-CM

## 2025-01-03 DIAGNOSIS — K21.9 GASTROESOPHAGEAL REFLUX DISEASE WITHOUT ESOPHAGITIS: ICD-10-CM

## 2025-01-03 DIAGNOSIS — Z79.899 TRANSGENDER MAN ON HORMONE THERAPY: ICD-10-CM

## 2025-01-03 DIAGNOSIS — R20.0 NUMBNESS AND TINGLING OF BOTH UPPER EXTREMITIES: ICD-10-CM

## 2025-01-03 DIAGNOSIS — R27.8 DECREASED COORDINATION: ICD-10-CM

## 2025-01-03 LAB
ALBUMIN UR-MCNC: NEGATIVE MG/DL
APPEARANCE UR: CLEAR
BACTERIA #/AREA URNS HPF: ABNORMAL /HPF
BILIRUB UR QL STRIP: NEGATIVE
COLOR UR AUTO: YELLOW
GLUCOSE UR STRIP-MCNC: NEGATIVE MG/DL
HGB UR QL STRIP: ABNORMAL
KETONES UR STRIP-MCNC: NEGATIVE MG/DL
LEUKOCYTE ESTERASE UR QL STRIP: ABNORMAL
MUCOUS THREADS #/AREA URNS LPF: PRESENT /LPF
NITRATE UR QL: NEGATIVE
PH UR STRIP: 7 [PH] (ref 5–8)
RBC #/AREA URNS AUTO: ABNORMAL /HPF
SP GR UR STRIP: 1.02 (ref 1–1.03)
SQUAMOUS #/AREA URNS AUTO: ABNORMAL /LPF
UROBILINOGEN UR STRIP-ACNC: 0.2 E.U./DL
WBC #/AREA URNS AUTO: ABNORMAL /HPF

## 2025-01-03 PROCEDURE — 73030 X-RAY EXAM OF SHOULDER: CPT | Mod: TC | Performed by: RADIOLOGY

## 2025-01-03 PROCEDURE — 81001 URINALYSIS AUTO W/SCOPE: CPT | Performed by: FAMILY MEDICINE

## 2025-01-03 PROCEDURE — 99214 OFFICE O/P EST MOD 30 MIN: CPT | Performed by: FAMILY MEDICINE

## 2025-01-03 RX ORDER — TESTOSTERONE CYPIONATE 200 MG/ML
0.2 INJECTION, SOLUTION INTRAMUSCULAR
COMMUNITY
Start: 2025-01-03

## 2025-01-03 RX ORDER — BUSPIRONE HYDROCHLORIDE 15 MG/1
TABLET ORAL
Qty: 180 TABLET | Refills: 3 | Status: SHIPPED | OUTPATIENT
Start: 2025-01-03

## 2025-01-03 ASSESSMENT — PAIN SCALES - GENERAL: PAINLEVEL_OUTOF10: SEVERE PAIN (6)

## 2025-01-03 ASSESSMENT — ANXIETY QUESTIONNAIRES
7. FEELING AFRAID AS IF SOMETHING AWFUL MIGHT HAPPEN: MORE THAN HALF THE DAYS
1. FEELING NERVOUS, ANXIOUS, OR ON EDGE: NEARLY EVERY DAY
6. BECOMING EASILY ANNOYED OR IRRITABLE: MORE THAN HALF THE DAYS
8. IF YOU CHECKED OFF ANY PROBLEMS, HOW DIFFICULT HAVE THESE MADE IT FOR YOU TO DO YOUR WORK, TAKE CARE OF THINGS AT HOME, OR GET ALONG WITH OTHER PEOPLE?: SOMEWHAT DIFFICULT
IF YOU CHECKED OFF ANY PROBLEMS ON THIS QUESTIONNAIRE, HOW DIFFICULT HAVE THESE PROBLEMS MADE IT FOR YOU TO DO YOUR WORK, TAKE CARE OF THINGS AT HOME, OR GET ALONG WITH OTHER PEOPLE: SOMEWHAT DIFFICULT
GAD7 TOTAL SCORE: 17
4. TROUBLE RELAXING: NEARLY EVERY DAY
5. BEING SO RESTLESS THAT IT IS HARD TO SIT STILL: SEVERAL DAYS
3. WORRYING TOO MUCH ABOUT DIFFERENT THINGS: NEARLY EVERY DAY
GAD7 TOTAL SCORE: 17
2. NOT BEING ABLE TO STOP OR CONTROL WORRYING: NEARLY EVERY DAY
7. FEELING AFRAID AS IF SOMETHING AWFUL MIGHT HAPPEN: MORE THAN HALF THE DAYS
GAD7 TOTAL SCORE: 17

## 2025-01-03 NOTE — PATIENT INSTRUCTIONS
Recommended The Anxiety and Phobia Workbook by Adarsh Childress and discussed how to use the book.    Neurology scheduling: [phone]     [email or web address]

## 2025-01-03 NOTE — LETTER
January 3, 2025      Nadia Jaimes  [address]  [facility] [id number]        To Whom It May Concern:    Keny Jaimes was seen in our clinic. Keny may return to work with the following: special equipment needed: forearm crutch for the left arm. This is a permanent need.      Sincerely,      ROSA GARCIA      Electronically signed

## 2025-01-03 NOTE — PROGRESS NOTES
"Assessment & Plan   CORY (generalized anxiety disorder)  Will start at 5 mg increasing by 5 mg every four days to 15 mg bid.  - busPIRone (BUSPAR) 15 MG tablet  Dispense: 180 tablet; Refill: 3    Severe recurrent major depression without psychotic features (H)    Gastroesophageal reflux disease without esophagitis  Their previous EGD was fully normal.  Continue PPI.    Autism  Doing well with work.    Eating disorder, unspecified type  Restrictive type. Not extending foods yet, but they have not given up on this.    Transgender man on hormone therapy  Stable.    Numbness and tingling of both upper extremities  They were concerned this is a symptom of MS. It is positional.  - Adult Neurology  Referral    Decreased coordination  - Adult Neurology  Referral  Note given for use of a cane.  Chronic left shoulder pain  - XR Shoulder Left 2 Views  - Occupational Therapy  Referral    Urinary frequency  It may represent ICS.  - UA Macroscopic with reflex to Microscopic and Culture - Lab Collect  - UA Microscopic with Reflex to Culture    Subjective   Keny is a 29 year old, presenting for the following health issues:  office visit (Patient reports they are here to follow up on anti-depressant and leg. )        1/3/2025     9:09 AM   Additional Questions   Roomed by Antonella   Accompanied by Kenrick         1/3/2025     9:09 AM   Patient Reported Additional Medications   Patient reports taking the following new medications none     Venlafaxine was not as bad as Lexapro. It made them tired and memory was worse. Felt better when they missed a dose, so they quit it.  Feels that they have anxiety, not depression and the depressive symptoms are \"autism burnout.\" Always second guess what people think. Had a long hug with their partner and walked away worrying that something was terribly wrong (partner did not feel that way.)  Sleep is okay, but busy to sleep as much.  A friend told them it could be multiple " "sclerosis.  Using a cane, but they won't let them use it at work. 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  My hands have a lot of pain and stiffness. Cannot play music or draw. The left shoulder hurt for years, no injury, though used to sleep on the floor and thinks the pressure may have triggered it. Burning in the supraclavicular area. No joint swelling.  Gets head rushes with bending down to spit in the sink.  Numbness and tingling from shoulder to fingers is instant on laying down.  Feels they have coordination problems with arms and legs. Leg weakness. Walks into things.  Diagnosed with an ovarian cyst and has had continues to have intermittent pelvic pain on the left.  Urinary frequency for 1 1/2 year in small amounts.    History of Present Illness       Mental Health Follow-up:  Patient presents to follow-up on Depression & Anxiety.Patient's depression since last visit has been:  No change  The patient is not having other symptoms associated with depression.  Patient's anxiety since last visit has been:  Bad  The patient is not having other symptoms associated with anxiety.  Any significant life events: job concerns and health concerns  Patient is feeling anxious or having panic attacks.  Patient has no concerns about alcohol or drug use.    Reason for visit:  Antidepressants and knee pain and other potentially related concerns    Keny eats 0-1 servings of fruits and vegetables daily.Keny consumes 0 sweetened beverage(s) daily.Keny exercises with enough effort to increase Keny's heart rate 30 to 60 minutes per day.  Keny exercises with enough effort to increase Keny's heart rate 4 days per week.   Keny is taking medications regularly.       Objective    /82 (BP Location: Left arm, Patient Position: Sitting, Cuff Size: Adult Regular)   Pulse 70   Temp 98.1  F (36.7  C) (Tympanic)   Ht 1.626 m (5' 4\")   " Wt 75.3 kg (165 lb 14.4 oz)   SpO2 100%   BMI 28.48 kg/m    Body mass index is 28.48 kg/m .  Physical Exam   GENERAL: alert and no distress  EYES: Eyes grossly normal to inspection, PERRL and conjunctivae and sclerae normal  HENT: ear canals and TM's normal, nose and mouth without ulcers or lesions  NECK: no adenopathy, no asymmetry, masses, or scars  RESP: lungs clear to auscultation - no rales, rhonchi or wheezes  CV: regular rate and rhythm, normal S1 S2, no S3 or S4, no murmur, click or rub, no peripheral edema  ABDOMEN: soft, nontender, no hepatosplenomegaly, no masses and bowel sounds normal  MS: no gross musculoskeletal defects noted, no edema  SKIN: no suspicious lesions or rashes  NEURO: Normal strength and tone, mentation intact and speech normal  PSYCH: mentation appears normal, affect normal/bright    Results for orders placed or performed in visit on 01/03/25   XR Shoulder Left 2 Views     Status: None    Narrative    EXAM: XR SHOULDER LEFT 2 VIEWS  LOCATION: [facility]  DATE: 1/3/2025    INDICATION:  Chronic left shoulder pain, Chronic left shoulder pain  COMPARISON: None.      Impression    IMPRESSION: Normal joint spaces and alignment. No fracture.   Results for orders placed or performed in visit on 01/03/25   UA Macroscopic with reflex to Microscopic and Culture - Lab Collect     Status: Abnormal    Specimen: Urine, Clean Catch   Result Value Ref Range    Color Urine Yellow Colorless, Straw, Light Yellow, Yellow    Appearance Urine Clear Clear    Glucose Urine Negative Negative mg/dL    Bilirubin Urine Negative Negative    Ketones Urine Negative Negative mg/dL    Specific Gravity Urine 1.025 1.005 - 1.030    Blood Urine Trace (A) Negative    pH Urine 7.0 5.0 - 8.0    Protein Albumin Urine Negative Negative mg/dL    Urobilinogen Urine 0.2 0.2, 1.0 E.U./dL    Nitrite Urine Negative Negative    Leukocyte Esterase Urine Trace (A) Negative   UA Microscopic with Reflex to Culture      Status: Abnormal   Result Value Ref Range    Bacteria Urine Many (A) None Seen /HPF    RBC Urine 0-2 0-2 /HPF /HPF    WBC Urine 5-10 (A) 0-5 /HPF /HPF    Squamous Epithelials Urine Few (A) None Seen /LPF    Mucus Urine Present (A) None Seen /LPF    Narrative    Urine Culture not indicated   Signed Electronically by: ROSA GARCIA MD

## 2025-01-06 DIAGNOSIS — G89.29 CHRONIC LEFT SHOULDER PAIN: Primary | ICD-10-CM

## 2025-01-06 DIAGNOSIS — M25.512 CHRONIC LEFT SHOULDER PAIN: Primary | ICD-10-CM

## 2025-01-06 NOTE — TELEPHONE ENCOUNTER
REFERRAL INFORMATION:  Referring Provider: Dr. Ellie Painter  Referring Clinic:  [facility]  Reason for Visit/Diagnosis: Gastroesophageal reflux disease w/o esophagitis      FUTURE VISIT INFORMATION:  Appointment Date: 2/5/2025  Appointment Time: 1 PM     NOTES STATUS DETAILS   OFFICE NOTE from Referring Provider Internal [facility]:  1/3/25, 11/25/24 - [facility] OV with Dr. Painter   OFFICE NOTE from Other Specialist Received [facility]:  5/4/23 - [facility] OV with Dr. Vann  7/9/21 - [facility] OV with  [facility] DISCHARGE SUMMARY/  ED VISITS N/A    OPERATIVE REPORT N/A    MEDICATION LIST Internal         PERTINENT LABS Received / Internal

## 2025-02-03 ENCOUNTER — OFFICE VISIT (OUTPATIENT)
Dept: FAMILY MEDICINE | Facility: CLINIC | Age: 30
End: 2025-02-03
Payer: COMMERCIAL

## 2025-02-03 VITALS
DIASTOLIC BLOOD PRESSURE: 65 MMHG | WEIGHT: 162 LBS | SYSTOLIC BLOOD PRESSURE: 107 MMHG | HEIGHT: 64 IN | TEMPERATURE: 97.3 F | BODY MASS INDEX: 27.66 KG/M2 | RESPIRATION RATE: 10 BRPM | OXYGEN SATURATION: 100 % | HEART RATE: 78 BPM

## 2025-02-03 DIAGNOSIS — F41.1 GAD (GENERALIZED ANXIETY DISORDER): ICD-10-CM

## 2025-02-03 DIAGNOSIS — F64.0 TRANSGENDER MAN ON HORMONE THERAPY: ICD-10-CM

## 2025-02-03 DIAGNOSIS — F64.9 GENDER DYSPHORIA: ICD-10-CM

## 2025-02-03 DIAGNOSIS — Z11.4 SCREENING FOR HIV (HUMAN IMMUNODEFICIENCY VIRUS): ICD-10-CM

## 2025-02-03 DIAGNOSIS — Z11.59 NEED FOR HEPATITIS C SCREENING TEST: ICD-10-CM

## 2025-02-03 DIAGNOSIS — Z79.899 TRANSGENDER MAN ON HORMONE THERAPY: ICD-10-CM

## 2025-02-03 DIAGNOSIS — T80.90XD INJECTION SITE REACTION, SUBSEQUENT ENCOUNTER: Primary | ICD-10-CM

## 2025-02-03 PROCEDURE — G2211 COMPLEX E/M VISIT ADD ON: HCPCS | Performed by: FAMILY MEDICINE

## 2025-02-03 PROCEDURE — 99214 OFFICE O/P EST MOD 30 MIN: CPT | Performed by: FAMILY MEDICINE

## 2025-02-03 RX ORDER — BUSPIRONE HYDROCHLORIDE 10 MG/1
TABLET ORAL
Qty: 200 TABLET | Refills: 3 | Status: SHIPPED | OUTPATIENT
Start: 2025-02-03

## 2025-02-03 ASSESSMENT — PATIENT HEALTH QUESTIONNAIRE - PHQ9
10. IF YOU CHECKED OFF ANY PROBLEMS, HOW DIFFICULT HAVE THESE PROBLEMS MADE IT FOR YOU TO DO YOUR WORK, TAKE CARE OF THINGS AT HOME, OR GET ALONG WITH OTHER PEOPLE: VERY DIFFICULT
SUM OF ALL RESPONSES TO PHQ QUESTIONS 1-9: 19
SUM OF ALL RESPONSES TO PHQ QUESTIONS 1-9: 19

## 2025-02-03 ASSESSMENT — PAIN SCALES - GENERAL: PAINLEVEL_OUTOF10: MILD PAIN (1)

## 2025-02-03 NOTE — PROGRESS NOTES
"Assessment & Plan   The longitudinal plan of care for the diagnosis(es)/condition(s) as documented were addressed during this visit. Due to the added complexity in care, I will continue to support Keny in the subsequent management and with ongoing continuity of care.    Injection site reaction, subsequent encounter  Will need to change from cypionate to enanthate testosterone due to the reaction. Noted he cannot be allergic to testosterone since male and femaie bodies make it.    Transgender man on hormone therapy  Cannot use cypionate due to the allergy to the component.  - testosterone enanthate (DELATESTRYL) injection 50 mg  Labs in 12 weeks from starting this.   See me five days later.    CORY (generalized anxiety disorder)   busPIRone (BUSPAR) 10 MG tablet; 1 po bid. You may take an extra 5-10 mg on anxious days.    Screening for HIV (human immunodeficiency virus)  - HIV Antigen Antibody Combo; Future    Need for hepatitis C screening test  - Hepatitis C Screen Reflex to HCV RNA Quant and Genotype; Future    Gender dysphoria  - testosterone enanthate (DELATESTRYL) injection 50 mg    MED REC REQUIRED done    Subjective   Keny is a 29 year old, presenting for the following health issues:  Follow Up ([facility] 1/26 for cellulitis of lower extremities bilateral. Patient states extreme itching.)      2/3/2025     1:05 PM   Additional Questions   Roomed by Glory GAMEZ   Accompanied by Marcos/partner     Gave their fourth injection and almost instantly the four injection sites were red, swollen and large. Never ran a fever. The redness and tenderness faded, but now it is itching and scaling.  Partner is working one job, looking for a second.        Objective    /65 (BP Location: Left leg, Patient Position: Sitting, Cuff Size: Adult Regular)   Pulse 78   Temp 97.3  F (36.3  C) (Tympanic)   Resp 10   Ht 1.626 m (5' 4.02\")   Wt 73.5 kg (162 lb)   LMP  (LMP Unknown)   SpO2 100%   BMI 27.79 kg/m    Body " mass index is 27.79 kg/m .  Physical Exam   GENERAL: healthy, alert and no distress  EYES: grossly normal to inspection, and conjunctivae and sclerae normal  RESP: breathing unlabored, no cough or throat clearing.  MS: no gross musculoskeletal defects noted.  SKIN: 2 patches on each anterior thigh are lightlyerythematous with some scaling. No induration.  NEURO: Normal strength and tone, mentation intact and speech normal  PSYCH: mentation appears normal, affect normal/bright   Signed Electronically by: ROSA GARCIA MD

## 2025-02-05 ENCOUNTER — PRE VISIT (OUTPATIENT)
Dept: GASTROENTEROLOGY | Facility: CLINIC | Age: 30
End: 2025-02-05

## 2025-02-10 ENCOUNTER — TELEPHONE (OUTPATIENT)
Dept: GASTROENTEROLOGY | Facility: CLINIC | Age: 30
End: 2025-02-10
Payer: COMMERCIAL

## 2025-02-10 NOTE — TELEPHONE ENCOUNTER
[facility] Call Center    Phone Message    May a detailed message be left on voicemail: Yes    Reason for Call: Other: Patient is currently scheduled on 3/11, as visit type New GI Urgent. This is outside the expected timeline for this referral. Patient has been added to the waitlist.      Action Taken: Message routed to:  Other: GI REFERRAL TRIAGE POOL     Travel Screening: Not Applicable

## 2025-02-11 PROBLEM — L03.119 CELLULITIS AND ABSCESS OF LEG: Status: ACTIVE | Noted: 2025-02-11

## 2025-02-11 PROBLEM — L02.419 CELLULITIS AND ABSCESS OF LEG: Status: ACTIVE | Noted: 2025-02-11

## 2025-02-11 PROBLEM — F41.1 GAD (GENERALIZED ANXIETY DISORDER): Status: ACTIVE | Noted: 2025-02-11

## 2025-02-11 RX ORDER — TESTOSTERONE ENANTHATE 200 MG/ML
50 INJECTION, SOLUTION INTRAMUSCULAR ONCE
Status: ACTIVE | OUTPATIENT
Start: 2025-02-11

## 2025-03-03 ENCOUNTER — OFFICE VISIT (OUTPATIENT)
Dept: FAMILY MEDICINE | Facility: CLINIC | Age: 30
End: 2025-03-03
Payer: COMMERCIAL

## 2025-03-03 VITALS
DIASTOLIC BLOOD PRESSURE: 71 MMHG | HEART RATE: 90 BPM | HEIGHT: 64 IN | WEIGHT: 165 LBS | OXYGEN SATURATION: 99 % | SYSTOLIC BLOOD PRESSURE: 112 MMHG | TEMPERATURE: 97.8 F | BODY MASS INDEX: 28.17 KG/M2 | RESPIRATION RATE: 13 BRPM

## 2025-03-03 DIAGNOSIS — K21.9 GASTROESOPHAGEAL REFLUX DISEASE WITHOUT ESOPHAGITIS: ICD-10-CM

## 2025-03-03 DIAGNOSIS — Z12.4 CERVICAL CANCER SCREENING: Primary | ICD-10-CM

## 2025-03-03 DIAGNOSIS — F33.2 SEVERE RECURRENT MAJOR DEPRESSION WITHOUT PSYCHOTIC FEATURES (H): ICD-10-CM

## 2025-03-03 DIAGNOSIS — Z00.00 ROUTINE GENERAL MEDICAL EXAMINATION AT A HEALTH CARE FACILITY: ICD-10-CM

## 2025-03-03 PROCEDURE — 3078F DIAST BP <80 MM HG: CPT | Performed by: FAMILY MEDICINE

## 2025-03-03 PROCEDURE — 1126F AMNT PAIN NOTED NONE PRSNT: CPT | Performed by: FAMILY MEDICINE

## 2025-03-03 PROCEDURE — 87624 HPV HI-RISK TYP POOLED RSLT: CPT | Performed by: FAMILY MEDICINE

## 2025-03-03 PROCEDURE — 99214 OFFICE O/P EST MOD 30 MIN: CPT | Mod: 25 | Performed by: FAMILY MEDICINE

## 2025-03-03 PROCEDURE — 99395 PREV VISIT EST AGE 18-39: CPT | Performed by: FAMILY MEDICINE

## 2025-03-03 PROCEDURE — G2211 COMPLEX E/M VISIT ADD ON: HCPCS | Performed by: FAMILY MEDICINE

## 2025-03-03 PROCEDURE — 3074F SYST BP LT 130 MM HG: CPT | Performed by: FAMILY MEDICINE

## 2025-03-03 PROCEDURE — G0145 SCR C/V CYTO,THINLAYER,RESCR: HCPCS | Performed by: FAMILY MEDICINE

## 2025-03-03 RX ORDER — BUPROPION HYDROCHLORIDE 150 MG/1
150 TABLET ORAL EVERY MORNING
Qty: 90 TABLET | Refills: 0 | Status: SHIPPED | OUTPATIENT
Start: 2025-03-03

## 2025-03-03 RX ORDER — ESOMEPRAZOLE MAGNESIUM 40 MG/1
40 CAPSULE, DELAYED RELEASE ORAL 2 TIMES DAILY
Qty: 90 CAPSULE | Refills: 0 | Status: SHIPPED | OUTPATIENT
Start: 2025-03-03

## 2025-03-03 RX ORDER — TESTOSTERONE ENANTHATE 200 MG/ML
INJECTION, SOLUTION INTRAMUSCULAR
COMMUNITY
Start: 2025-03-03

## 2025-03-03 SDOH — HEALTH STABILITY: PHYSICAL HEALTH: ON AVERAGE, HOW MANY MINUTES DO YOU ENGAGE IN EXERCISE AT THIS LEVEL?: 0 MIN

## 2025-03-03 SDOH — HEALTH STABILITY: PHYSICAL HEALTH: ON AVERAGE, HOW MANY DAYS PER WEEK DO YOU ENGAGE IN MODERATE TO STRENUOUS EXERCISE (LIKE A BRISK WALK)?: 0 DAYS

## 2025-03-03 ASSESSMENT — SOCIAL DETERMINANTS OF HEALTH (SDOH): HOW OFTEN DO YOU GET TOGETHER WITH FRIENDS OR RELATIVES?: ONCE A WEEK

## 2025-03-03 ASSESSMENT — PAIN SCALES - GENERAL: PAINLEVEL_OUTOF10: NO PAIN (0)

## 2025-03-03 ASSESSMENT — PATIENT HEALTH QUESTIONNAIRE - PHQ9: SUM OF ALL RESPONSES TO PHQ QUESTIONS 1-9: 15

## 2025-03-03 NOTE — PROGRESS NOTES
Preventive Care Visit  [facility]  ROSA GARCIA MD, Family Medicine  Mar 3, 2025      Assessment & Plan     Routine preventative care with gynecologic exam.  Cervical cancer screening  - HPV and Gynecologic Cytology Panel - Recommended Age 30 - 65 Years  - Gynecologic Cytology (PAP)    Gastroesophageal reflux disease without esophagitis  - esomeprazole (NEXIUM) 40 MG DR capsule; Take 1 capsule (40 mg) by mouth 2 times daily. Take 30-60 minutes before eating.    Severe recurrent major depression without psychotic features (H)  - Adult Mental Health [facility] Referral; Future  - buPROPion (WELLBUTRIN XL) 150 MG 24 hr tablet; Take 1 tablet (150 mg) by mouth every morning.  OCD sounds correct.    The longitudinal plan of care for the diagnosis(es)/condition(s) as documented were addressed during this visit. Due to the added complexity in care, I will continue to support Keny in the subsequent management and with ongoing continuity of care.    Counseling  Appropriate preventive services were addressed with this patient via screening, questionnaire, or discussion as appropriate for fall prevention, nutrition, physical activity, Tobacco-use cessation, social engagement, weight loss and cognition.  Checklist reviewing preventive services available has been given to the patient.  The patient's PHQ-9 score is consistent with moderate depression. Keny was provided with information regarding depression.     Subjective   Keny is a 29 year old, presenting for the following:  Physical        3/3/2025     1:58 PM   Additional Questions   Roomed by ezequiel antonio      Off testosterone for three weeks. His period has not restarted yet. [facility] to [facility] this their [facility] care.  Cut back on buspirone due to dizziness. Takes 5 mg in the morning and 10 mg in the evening. It caused vomiting when they worked up to 15 mg once a day.  Looking for a job they like. Does not like the current  ones.  Escitalopram and venlafaxine caused dysphoria.  Does not feel they have had a manic phase. Feels they have OCD. Intrusive thoughts about sex with people they don't like or are attracted to. Contamination concern-- food.    History of Present Illness       Reason for visit:  Pap smear   Keny is taking medications regularly.  Advance Care Planning  Patient does not have a Health Care Directive: Discussed advance care planning with patient; information given to patient to review.      3/3/2025   General Health   How would you rate your overall physical health? (!) FAIR   Feel stress (tense, anxious, or unable to sleep) Very much   (!) STRESS CONCERN      3/3/2025   Nutrition   Three or more servings of calcium each day? (!) NO   Diet: Regular (no restrictions)   How many servings of fruit and vegetables per day? (!) 2-3   How many sweetened beverages each day? 0-1         3/3/2025   Exercise   Days per week of moderate/strenous exercise 0 days   Average minutes spent exercising at this level 0 min   (!) EXERCISE CONCERN      3/3/2025   Social Factors   Frequency of gathering with friends or relatives Once a week   Worry food won't last until get money to buy more No   Food not last or not have enough money for food? No   Do you have housing? (Housing is defined as stable permanent housing and does not include staying ouside in a car, in a tent, in an abandoned building, in an overnight shelter, or couch-surfing.) Yes   Are you worried about losing your housing? No   Lack of transportation? No   Unable to get utilities (heat,electricity)? No         3/3/2025   Dental   Dentist two times every year? (!) NO         9/6/2024   TB Screening   Were you born outside of the [place]? No         3/3/2025     2:04 PM   PHQ-9 SCORE   PHQ-9 Total Score 15         3/3/2025   Substance Use   Alcohol more than 3/day or more than 7/wk No   Do you use any other substances recreationally? No     Social History     Tobacco Use     Smoking status: Never     Passive exposure: Never    Smokeless tobacco: Never   Vaping Use    Vaping status: Never Used          Mammogram Screening - Patient under 40 years of age: Routine Mammogram Screening not recommended.         3/3/2025   One time HIV Screening   Previous HIV test? Yes         3/3/2025   STI Screening   New sexual partner(s) since last STI/HIV test? Currently monogamous     History of abnormal Pap smear: No - age 30-64 HPV with reflex Pap every 5 years recommended        3/3/2025   Contraception/Family Planning   Questions about contraception or family planning No        Reviewed and updated as needed this visit by Provider                  Patient Active Problem List   Diagnosis    Severe recurrent major depression without psychotic features (H)    Eating disorder, unspecified type    Gastroesophageal reflux disease without esophagitis    Autism    CORY (generalized anxiety disorder)    Cellulitis and abscess of leg     Past Surgical History:   Procedure Laterality Date    AS ESOPHAGOSCOPY, DIAGNOSTIC  03/29/2023    All normal       Social History     Tobacco Use    Smoking status: Never     Passive exposure: Never    Smokeless tobacco: Never   Substance Use Topics    Alcohol use: Not Currently     Family History   Problem Relation Age of Onset    Coronary Artery Disease Mother 40        two small heart attacks    Other Problems  (estranged) Father     No Known Problems Half-sibling          Current Outpatient Medications   Medication Sig Dispense Refill    buPROPion (WELLBUTRIN XL) 150 MG 24 hr tablet Take 1 tablet (150 mg) by mouth every morning. 90 tablet 0    busPIRone (BUSPAR) 10 MG tablet 1 po bid. You may take an extra 5-10 mg on anxious days. 200 tablet 3    esomeprazole (NEXIUM) 40 MG DR capsule Take 1 capsule (40 mg) by mouth 2 times daily. Take 30-60 minutes before eating. 90 capsule 0    testosterone enanthate (DELATESTRYL) 200 MG/ML [facility]: 0.2ml  "subcutaneous weekly      omeprazole (PRILOSEC) 40 MG DR capsule Take 1 capsule (40 mg) by mouth 2 times daily. 120 capsule 1     Allergies   Allergen Reactions    Penicillins Other (See Comments)     Review of Systems  Constitutional, HEENT, cardiovascular, pulmonary, GI, , musculoskeletal, neuro, skin, endocrine and psych systems are negative, except as otherwise noted.     Objective    Exam  /71 (BP Location: Left arm, Patient Position: Sitting, Cuff Size: Adult Regular)   Pulse 90   Temp 97.8  F (36.6  C)   Resp 13   Ht 1.626 m (5' 4\")   Wt 74.8 kg (165 lb)   LMP  (LMP Unknown)   SpO2 99%   BMI 28.32 kg/m     Estimated body mass index is 28.32 kg/m  as calculated from the following:    Height as of this encounter: 1.626 m (5' 4\").    Weight as of this encounter: 74.8 kg (165 lb).    Physical Exam  GENERAL: alert and no distress  EYES: Eyes grossly normal to inspection, PERRL and conjunctivae and sclerae normal  HENT: ear canals and TM's normal, nose and mouth without ulcers or lesions  NECK: no adenopathy, no asymmetry, masses, or scars  RESP: lungs clear to auscultation - no rales, rhonchi or wheezes  CV: regular rate and rhythm, normal S1 S2, no S3 or S4, no murmur, click or rub, no peripheral edema  ABDOMEN: soft, nontender, no hepatosplenomegaly, no masses and bowel sounds normal  MS: no gross musculoskeletal defects noted, no edema  SKIN: no suspicious lesions or rashes  NEURO: Normal strength and tone, mentation intact and speech normal  PSYCH: mentation appears normal, affect normal/bright  Signed Electronically by: ROSA GARCIA MD    "

## 2025-03-04 ENCOUNTER — PATIENT OUTREACH (OUTPATIENT)
Dept: CARE COORDINATION | Facility: CLINIC | Age: 30
End: 2025-03-04
Payer: COMMERCIAL

## 2025-03-07 LAB
BKR AP ASSOCIATED HPV REPORT: NORMAL
BKR LAB AP GYN ADEQUACY: NORMAL
BKR LAB AP GYN INTERPRETATION: NORMAL
BKR LAB AP LMP: NORMAL
BKR LAB AP PREVIOUS ABNORMAL: NORMAL
PATH REPORT.COMMENTS IMP SPEC: NORMAL
PATH REPORT.COMMENTS IMP SPEC: NORMAL
PATH REPORT.RELEVANT HX SPEC: NORMAL

## 2025-03-11 ENCOUNTER — OFFICE VISIT (OUTPATIENT)
Dept: GASTROENTEROLOGY | Facility: CLINIC | Age: 30
End: 2025-03-11
Payer: COMMERCIAL

## 2025-03-11 VITALS
SYSTOLIC BLOOD PRESSURE: 107 MMHG | WEIGHT: 166.1 LBS | HEART RATE: 67 BPM | OXYGEN SATURATION: 99 % | HEIGHT: 64 IN | DIASTOLIC BLOOD PRESSURE: 66 MMHG | BODY MASS INDEX: 28.36 KG/M2

## 2025-03-11 DIAGNOSIS — K21.9 GASTROESOPHAGEAL REFLUX DISEASE, UNSPECIFIED WHETHER ESOPHAGITIS PRESENT: Primary | ICD-10-CM

## 2025-03-11 DIAGNOSIS — R10.13 EPIGASTRIC PAIN: ICD-10-CM

## 2025-03-11 PROCEDURE — 3078F DIAST BP <80 MM HG: CPT | Performed by: PHYSICIAN ASSISTANT

## 2025-03-11 PROCEDURE — 99204 OFFICE O/P NEW MOD 45 MIN: CPT | Performed by: PHYSICIAN ASSISTANT

## 2025-03-11 PROCEDURE — 3074F SYST BP LT 130 MM HG: CPT | Performed by: PHYSICIAN ASSISTANT

## 2025-03-11 PROCEDURE — 1125F AMNT PAIN NOTED PAIN PRSNT: CPT | Performed by: PHYSICIAN ASSISTANT

## 2025-03-11 RX ORDER — OMEPRAZOLE 40 MG/1
40 CAPSULE, DELAYED RELEASE ORAL 2 TIMES DAILY
Qty: 120 CAPSULE | Refills: 1 | Status: SHIPPED | OUTPATIENT
Start: 2025-03-11

## 2025-03-11 ASSESSMENT — PAIN SCALES - GENERAL: PAINLEVEL_OUTOF10: MILD PAIN (3)

## 2025-03-11 NOTE — PROGRESS NOTES
NEW PATIENT GI CLINIC VISIT    CC/REFERRING MD:  Ellie Painter  REASON FOR CONSULTATION:   Nadia Jaimes is a 29 year old adult who I was asked to see in consultation at the request of Dr. Ellie Painter for   Chief Complaint   Patient presents with    New Patient     New consult for GERD, chest pain and abdominal pain.       ASSESSMENT/PLAN:  29 year old adult with GERD and epigastric pain. Started after excessive NSAID use 2-3 years ago. Evaluation in [place] in 2023 with H pylori testing and EGD was unremarkable. Keny has had improvement with Nexium, but not full resolution and symptoms worsen significantly upon trying to stop PPI therapy. Currently only taking PPI once daily. We discussed increasing to twice daily dosing and also switching to an alternative PPI. Also discussed updating H pylori testing, but they are concerned about the ability to stop PPI x 2 weeks prior to testing. Has had negative testing in the past. Will therefore proceed as follows:  -Stop Nexium.  -Start omeprazole 40 mg BID. New Rx sent.  -Okay to use Gaviscon PRN as well as famotidine PRN or scheduled.  -Avoid gastric irritants including NSAIDs, dietary irritants, tobacco, alcohol, etc.  -Standard GERD precautions.  -Follow up in 4-6 weeks to recheck. If no improvement, consider EGD with Bravo to define whether this is truly GERD, particularly for some of the more atypical symptoms such as cough.  -At follow up, can also discuss further evaluation and treatment of nausea and constipation.     Thank you for this consultation. It was a pleasure to participate in the care of this patient; please contact us with any further questions.  A total of 25 face-to-face minutes was spent with this patient, >50% of which was counseling regarding the above delineated issues. An additional 20 minutes was spent on the date of the encounter doing chart review, documentation, and further activities as noted above.    This note was created with voice  recognition software, and while reviewed for accuracy, typos may remain.     Rae Morillo PA-C  Division of Gastroenterology, Hepatology and Nutrition  [facility]    ---------------------------------------------------------------------------------------------------  HPI:  Keny Jaimes is a 29 year old adult with past medical history significant for GERD, anxiety, depression, and gender dysphoria who presents for evaluation of GERD symptoms. Keny is accompanied by their significant other, Kenrick, at today's appointment. They report developing significant stomach pain and GERD symptoms 2-3 years ago after taking a lot of chewable Tylenol (?ibuprofen). Symptoms have been persistent since then with acid reflux, heartburn, stomach pain which is worse after eating acidic foods, cough which is worse after eating, as well as acid taste in mouth/throat which is worse in the morning. They have taken Nexium for years which has helped in the past, but not currently providing adequate relief. Symptoms do get worse when they miss Nexium dose. Has not tried twice daily dosing. Will use Gaviscon on occasion which provides excellent relief of symptoms. Keny reports daily nausea which predates GERD symptoms, but reports infrequent vomiting. Attributes this to other health issues. They deny dysphagia, odynophagia, weight loss, or black, tarry stools. Bowels tend more toward constipation with a BM occurring a few times per week, sometimes less often. This is long-standing.     Keny underwent evaluation for these symptoms in [place] a few years ago. H pylori testing negative. EGD on 3/29/2023 was normal per review of scanned records (pathology results unavailable, but patient believes biopsies were normal). There is mention in faxed records of patient being advised to try rabeprazole, but Keny does not recall trying any PPIs other than esomeprazole. Has not tried H2RA  therapy.     PREVIOUS ENDOSCOPY:  March 29, 2023 - EGD:    No record of histology results, but patient believes pathology was normal.    PROBLEM LIST  Patient Active Problem List    Diagnosis Date Noted    CORY (generalized anxiety disorder) 02/11/2025     Priority: Medium    Cellulitis and abscess of leg 02/11/2025     Priority: Medium    Severe recurrent major depression without psychotic features (H) 01/03/2025     Priority: Medium    Eating disorder, unspecified type 01/03/2025     Priority: Medium    Gastroesophageal reflux disease without esophagitis 01/03/2025     Priority: Medium    Autism 01/03/2025     Priority: Medium       PERTINENT PAST MEDICAL HISTORY:  No past medical history on file.    PREVIOUS SURGERIES:  Past Surgical History:   Procedure Laterality Date    AS ESOPHAGOSCOPY, DIAGNOSTIC  03/29/2023    All normal       ALLERGIES:     Allergies   Allergen Reactions    Penicillins Other (See Comments)       PERTINENT MEDICATIONS:  Current Outpatient Medications   Medication Sig Dispense Refill    buPROPion (WELLBUTRIN XL) 150 MG 24 hr tablet Take 1 tablet (150 mg) by mouth every morning. 90 tablet 0    busPIRone (BUSPAR) 10 MG tablet 1 po bid. You may take an extra 5-10 mg on anxious days. 200 tablet 3    esomeprazole (NEXIUM) 40 MG DR capsule Take 1 capsule (40 mg) by mouth 2 times daily. Take 30-60 minutes before eating. 90 capsule 0    testosterone enanthate (DELATESTRYL) 200 MG/ML [facility]: 0.2ml subcutaneous weekly       Current Facility-Administered Medications   Medication Dose Route Frequency Provider Last Rate Last Admin    testosterone enanthate (DELATESTRYL) injection 50 mg  50 mg Intramuscular Once            SOCIAL HISTORY:  Social History     Socioeconomic History    Marital status: Legally      Spouse name: Not on file    Number of children: Not on file    Years of education: Not on file    Highest education level: Not on file   Occupational History     Not on file   Tobacco Use    Smoking status: Never     Passive exposure: Never    Smokeless tobacco: Never   Vaping Use    Vaping status: Never Used   Substance and Sexual Activity    Alcohol use: Not Currently    Drug use: Not Currently    Sexual activity: Not on file   Other Topics Concern    Not on file   Social History Narrative    Not on file     Social Drivers of Health     Financial Resource Strain: Low Risk  (3/3/2025)    Financial Resource Strain     Within the past 12 months, have you or your family members you live with been unable to get utilities (heat, electricity) when it was really needed?: No   Food Insecurity: Low Risk  (3/3/2025)    Food Insecurity     Within the past 12 months, did you worry that your food would run out before you got money to buy more?: No     Within the past 12 months, did the food you bought just not last and you didn t have money to get more?: No   Transportation Needs: Low Risk  (3/3/2025)    Transportation Needs     Within the past 12 months, has lack of transportation kept you from medical appointments, getting your medicines, non-medical meetings or appointments, work, or from getting things that you need?: No   Physical Activity: Inactive (3/3/2025)    Exercise Vital Sign     Days of Exercise per Week: 0 days     Minutes of Exercise per Session: 0 min   Stress: Stress Concern Present (3/3/2025)    [employer] [place] - Occupational Stress Questionnaire     Feeling of Stress : Very much   Social Connections: Unknown (3/3/2025)    Social Connection and Isolation Panel [NHANES]     Frequency of Communication with Friends and Family: Not on file     Frequency of Social Gatherings with Friends and Family: Once a week     Attends Alevism Services: Not on file     Active Member of Clubs or Organizations: Not on file     Attends Club or Organization Meetings: Not on file     Marital Status: Not on file   Interpersonal Safety: Low Risk  (2/3/2025)     "Interpersonal Safety     Do you feel physically and emotionally safe where you currently live?: Yes     Within the past 12 months, have you been hit, slapped, kicked or otherwise physically hurt by someone?: No     Within the past 12 months, have you been humiliated or emotionally abused in other ways by your partner or ex-partner?: No   Housing Stability: Low Risk  (3/3/2025)    Housing Stability     Do you have housing? : Yes     Are you worried about losing your housing?: No       FAMILY HISTORY:  Family History   Problem Relation Age of Onset    Coronary Artery Disease Mother 40        two small heart attacks    Other Problems  (estranged) Father     No Known Problems Half-sibling        Past/family/social history reviewed and no changes    PHYSICAL EXAMINATION:  Vitals /66 (BP Location: Left arm, Patient Position: Sitting, Cuff Size: Adult Regular)   Pulse 67   Ht 1.626 m (5' 4\")   Wt 75.3 kg (166 lb 1.6 oz)   LMP  (LMP Unknown)   SpO2 99%   BMI 28.51 kg/m     Wt   Wt Readings from Last 2 Encounters:   03/11/25 75.3 kg (166 lb 1.6 oz)   03/03/25 74.8 kg (165 lb)      Gen: Resting comfortably in an exam chair, alert, no distress  Eyes: sclerae anicteric  ENT:  OP clear, MMM  Resp: No increased respiratory effort  Skin: Visible skin clear. No significant rash, abnormal pigmentation or lesions.  Neuro: Cranial nerves grossly intact.  Mentation and speech appropriate for age.  Psych: Appropriate affect, tone, and pace of words    PERTINENT STUDIES:  Reviewed      "

## 2025-03-11 NOTE — PATIENT INSTRUCTIONS
It was a pleasure meeting with you today and discussing your healthcare plan. Below is a summary of what we covered:    Stop taking the Nexium (esomeprazole).    Start taking omeprazole (Prilosec) 40 mg twice daily before meals (ideally, 30-60 minutes before).    If you want to try it, Pepcid (famotidine) is available over the counter and can be taken before bed as needed. Gaviscon is also fine to use.    Follow up (in person or virtual as long as you are in the state of [place]) in 4-6 weeks to recheck. If no improvement, the next step to consider might be an upper endoscopy with Bravo capsule.     Please see below for any additional questions and scheduling guidelines.    Sign up for [employer]: [employer] patient portal serves as a secure platform for accessing your medical records from the [facility]. Additionally, [employer] facilitates easy, timely, and secure messaging with your care team. If you have not signed up, you may do so by using the provided code or calling [phone].    Coordinating your care after your visit:  There are multiple options for scheduling your follow-up care based on your provider's recommendation.    How do I schedule a follow-up clinic appointment:   After your appointment, you may receive scheduling assistance with the Clinic Coordinators by having a seat in the waiting room and a Clinic Coordinator will call you up to schedule.  Virtual visits or after you leave the clinic:  Your provider has placed a follow-up order in the [employer] portal for scheduling your return appointment. A member of the scheduling team will contact you to schedule.  [employer] (formerly [employer]hart Scheduling: Timely scheduling through [employer] is advised to ensure appointment availability.   Call to schedule: You may schedule your follow-up appointment(s) by calling [phone], option 1.    How do I schedule my endoscopy or colonoscopy procedure:  If a procedure, such as a colonoscopy or upper endoscopy was ordered by your  provider, the scheduling team will contact you to schedule this procedure. Or you may choose to call to schedule at   [phone], option 2.  Please allow 20-30 minutes when scheduling a procedure.    How do I get my blood work done? To get your blood work done, you need to schedule a lab appointment at an [facility]. There are multiple ways to schedule:   At the clinic: The Clinic Coordinator you meet after your visit can help you schedule a lab appointment.   [facility] scheduling: [facility] offers online lab scheduling at all [facility] laboratory locations.   Call to schedule: You can call [phone] to schedule your lab appointment.    How do I schedule my imaging study: To schedule imaging studies, such as CT scans, ultrasounds, MRIs, or X-rays, contact Imaging Services at [phone].    How do I schedule a referral to another doctor: If your provider recommended a referral to another specialist(s), the referral order was placed by your provider. You will receive a phone call to schedule this referral, or you may choose to call the number attached to the referral to self-schedule.    For Post-Visit Question(s):  For any inquiries following today's visit:  Please utilize [employer] messaging and allow 48 hours for reply or contact the Call Center during normal business hours at [phone], option 3.  For Emergent After-hours questions, contact the On-Call GI Fellow through the [facility]  at [phone].  In addition, you may contact your Nurse directly using the provided contact information.    Test Results:  Test results will be accessible via [employer] in compliance with the 21st Century Cures Act. This means that your results will be available to you at the same time as your provider. Often you may see your results before your provider does. Results are reviewed by staff within two weeks with communication follow-up. Results may be released in the patient portal  prior to your care team review.    Prescription Refill(s):  Medication prescribed by your provider will be addressed during your visit. For future refills, please coordinate with your pharmacy. If you have not had a recent clinic visit or routine labs, for your safety, your provider may not be able to refill your prescription.

## 2025-03-11 NOTE — LETTER
3/11/2025      Nadia Jaimes  [address]  [facility] [address]      Dear Colleague,    Thank you for referring your patient, Nadia Jaimes, to the [facility]. Please see a copy of my visit note below.    NEW PATIENT GI CLINIC VISIT    CC/REFERRING MD:  Ellie Painter  REASON FOR CONSULTATION:   Nadia Jaimes is a 29 year old adult who I was asked to see in consultation at the request of Dr. Ellie Painetr for   Chief Complaint   Patient presents with     New Patient     New consult for GERD, chest pain and abdominal pain.       ASSESSMENT/PLAN:  29 year old adult with GERD and epigastric pain. Started after excessive NSAID use 2-3 years ago. Evaluation in [place] in 2023 with H pylori testing and EGD was unremarkable. Keny has had improvement with Nexium, but not full resolution and symptoms worsen significantly upon trying to stop PPI therapy. Currently only taking PPI once daily. We discussed increasing to twice daily dosing and also switching to an alternative PPI. Also discussed updating H pylori testing, but they are concerned about the ability to stop PPI x 2 weeks prior to testing. Has had negative testing in the past. Will therefore proceed as follows:  -Stop Nexium.  -Start omeprazole 40 mg BID. New Rx sent.  -Okay to use Gaviscon PRN as well as famotidine PRN or scheduled.  -Avoid gastric irritants including NSAIDs, dietary irritants, tobacco, alcohol, etc.  -Standard GERD precautions.  -Follow up in 4-6 weeks to recheck. If no improvement, consider EGD with Bravo to define whether this is truly GERD, particularly for some of the more atypical symptoms such as cough.  -At follow up, can also discuss further evaluation and treatment of nausea and constipation.     Thank you for this consultation. It was a pleasure to participate in the care of this patient; please contact us with any further questions.  A total of 25 face-to-face minutes was spent with this  patient, >50% of which was counseling regarding the above delineated issues. An additional 20 minutes was spent on the date of the encounter doing chart review, documentation, and further activities as noted above.    This note was created with voice recognition software, and while reviewed for accuracy, typos may remain.     Rae Morillo PA-C  Division of Gastroenterology, Hepatology and Nutrition  [facility]    ---------------------------------------------------------------------------------------------------  HPI:  Keny Jaimes is a 29 year old adult with past medical history significant for GERD, anxiety, depression, and gender dysphoria who presents for evaluation of GERD symptoms. Keny is accompanied by their significant other, Kenrick, at today's appointment. They report developing significant stomach pain and GERD symptoms 2-3 years ago after taking a lot of chewable Tylenol (?ibuprofen). Symptoms have been persistent since then with acid reflux, heartburn, stomach pain which is worse after eating acidic foods, cough which is worse after eating, as well as acid taste in mouth/throat which is worse in the morning. They have taken Nexium for years which has helped in the past, but not currently providing adequate relief. Symptoms do get worse when they miss Nexium dose. Has not tried twice daily dosing. Will use Gaviscon on occasion which provides excellent relief of symptoms. Keny reports daily nausea which predates GERD symptoms, but reports infrequent vomiting. Attributes this to other health issues. They deny dysphagia, odynophagia, weight loss, or black, tarry stools. Bowels tend more toward constipation with a BM occurring a few times per week, sometimes less often. This is long-standing.     Keny underwent evaluation for these symptoms in [place] a few years ago. H pylori testing negative. EGD on 3/29/2023 was normal per review of scanned  records (pathology results unavailable, but patient believes biopsies were normal). There is mention in faxed records of patient being advised to try rabeprazole, but Keny does not recall trying any PPIs other than esomeprazole. Has not tried H2RA therapy.     PREVIOUS ENDOSCOPY:  March 29, 2023 - EGD:    No record of histology results, but patient believes pathology was normal.    PROBLEM LIST  Patient Active Problem List    Diagnosis Date Noted     CORY (generalized anxiety disorder) 02/11/2025     Priority: Medium     Cellulitis and abscess of leg 02/11/2025     Priority: Medium     Severe recurrent major depression without psychotic features (H) 01/03/2025     Priority: Medium     Eating disorder, unspecified type 01/03/2025     Priority: Medium     Gastroesophageal reflux disease without esophagitis 01/03/2025     Priority: Medium     Autism 01/03/2025     Priority: Medium       PERTINENT PAST MEDICAL HISTORY:  No past medical history on file.    PREVIOUS SURGERIES:  Past Surgical History:   Procedure Laterality Date     AS ESOPHAGOSCOPY, DIAGNOSTIC  03/29/2023    All normal       ALLERGIES:     Allergies   Allergen Reactions     Penicillins Other (See Comments)       PERTINENT MEDICATIONS:  Current Outpatient Medications   Medication Sig Dispense Refill     buPROPion (WELLBUTRIN XL) 150 MG 24 hr tablet Take 1 tablet (150 mg) by mouth every morning. 90 tablet 0     busPIRone (BUSPAR) 10 MG tablet 1 po bid. You may take an extra 5-10 mg on anxious days. 200 tablet 3     esomeprazole (NEXIUM) 40 MG DR capsule Take 1 capsule (40 mg) by mouth 2 times daily. Take 30-60 minutes before eating. 90 capsule 0     testosterone enanthate (DELATESTRYL) 200 MG/ML [facility]: 0.2ml subcutaneous weekly       Current Facility-Administered Medications   Medication Dose Route Frequency Provider Last Rate Last Admin     testosterone enanthate (DELATESTRYL) injection 50 mg  50 mg Intramuscular Once             SOCIAL HISTORY:  Social History     Socioeconomic History     Marital status: Legally      Spouse name: Not on file     Number of children: Not on file     Years of education: Not on file     Highest education level: Not on file   Occupational History     Not on file   Tobacco Use     Smoking status: Never     Passive exposure: Never     Smokeless tobacco: Never   Vaping Use     Vaping status: Never Used   Substance and Sexual Activity     Alcohol use: Not Currently     Drug use: Not Currently     Sexual activity: Not on file   Other Topics Concern     Not on file   Social History Narrative     Not on file     Social Drivers of Health     Financial Resource Strain: Low Risk  (3/3/2025)    Financial Resource Strain      Within the past 12 months, have you or your family members you live with been unable to get utilities (heat, electricity) when it was really needed?: No   Food Insecurity: Low Risk  (3/3/2025)    Food Insecurity      Within the past 12 months, did you worry that your food would run out before you got money to buy more?: No      Within the past 12 months, did the food you bought just not last and you didn t have money to get more?: No   Transportation Needs: Low Risk  (3/3/2025)    Transportation Needs      Within the past 12 months, has lack of transportation kept you from medical appointments, getting your medicines, non-medical meetings or appointments, work, or from getting things that you need?: No   Physical Activity: Inactive (3/3/2025)    Exercise Vital Sign      Days of Exercise per Week: 0 days      Minutes of Exercise per Session: 0 min   Stress: Stress Concern Present (3/3/2025)    [employer] - Occupational Stress Questionnaire      Feeling of Stress : Very much   Social Connections: Unknown (3/3/2025)    Social Connection and Isolation Panel [NHANES]      Frequency of Communication with Friends and Family: Not on file      Frequency of Social  "Gatherings with Friends and Family: Once a week      Attends [employer] Services: Not on file      Active Member of Clubs or Organizations: Not on file      Attends Club or Organization Meetings: Not on file      Marital Status: Not on file   Interpersonal Safety: Low Risk  (2/3/2025)    Interpersonal Safety      Do you feel physically and emotionally safe where you currently live?: Yes      Within the past 12 months, have you been hit, slapped, kicked or otherwise physically hurt by someone?: No      Within the past 12 months, have you been humiliated or emotionally abused in other ways by your partner or ex-partner?: No   Housing Stability: Low Risk  (3/3/2025)    Housing Stability      Do you have housing? : Yes      Are you worried about losing your housing?: No       FAMILY HISTORY:  Family History   Problem Relation Age of Onset     Coronary Artery Disease Mother 40        two small heart attacks     Other Problems  (estranged) Father      No Known Problems Half-sibling        Past/family/social history reviewed and no changes    PHYSICAL EXAMINATION:  Vitals /66 (BP Location: Left arm, Patient Position: Sitting, Cuff Size: Adult Regular)   Pulse 67   Ht 1.626 m (5' 4\")   Wt 75.3 kg (166 lb 1.6 oz)   LMP  (LMP Unknown)   SpO2 99%   BMI 28.51 kg/m     Wt   Wt Readings from Last 2 Encounters:   03/11/25 75.3 kg (166 lb 1.6 oz)   03/03/25 74.8 kg (165 lb)      Gen: Resting comfortably in an exam chair, alert, no distress  Eyes: sclerae anicteric  ENT:  OP clear, MMM  Resp: No increased respiratory effort  Skin: Visible skin clear. No significant rash, abnormal pigmentation or lesions.  Neuro: Cranial nerves grossly intact.  Mentation and speech appropriate for age.  Psych: Appropriate affect, tone, and pace of words    PERTINENT STUDIES:  Reviewed        Again, thank you for allowing me to participate in the care of your patient.        Sincerely,        Rae Morillo PA-C    Electronically signed"

## 2025-03-19 LAB
HPV HR 12 DNA CVX QL NAA+PROBE: NEGATIVE
HPV16 DNA CVX QL NAA+PROBE: NEGATIVE
HPV18 DNA CVX QL NAA+PROBE: NEGATIVE
HUMAN PAPILLOMA VIRUS FINAL DIAGNOSIS: NORMAL

## 2025-04-28 NOTE — TELEPHONE ENCOUNTER
NEUROLOGY PRE- VISIT    RECORDS RECEVEIVED FROM: referred by Ellie Painter MD   REASON FOR VISIT: Numbness and tingling of both upper extremities, Decreased coordination   PROVIDER: Christy   DATE OF APPT: 05/08/2025     NOTES (FOR ALL VISITS) STATUS DETAILS   OFFICE NOTE from referring provider  Referral and notes in chart   OFFICE NOTE from other specialist     DISCHARGE SUMMARY from hospital     DISCHARGE REPORT from ER     OPERATIVE REPORT     EMG REPORT     EEG     Physical therapy  PT last completed 12/02/2024     IMAGING  (FOR ALL VISITS) STATUS DETAILS   MRI (HEAD, NECK, SPINE)  No imaging   XRAY (SPINE) *NEUROSURGERY*     CT (HEAD, NECK, SPINE)       Does patient have C2C?  Year last updated Action     YES   [x]   2024   Please update at appointment if outdated more than 5 years       NO     []   N/A   Please complete C2C at appointment

## 2025-05-07 ENCOUNTER — TELEPHONE (OUTPATIENT)
Dept: NEUROLOGY | Facility: CLINIC | Age: 30
End: 2025-05-07
Payer: COMMERCIAL

## 2025-05-07 NOTE — TELEPHONE ENCOUNTER
Attempted to reach patient to remind them about appointment scheduled with Meek Harrison MD on 5/8/25 in our [facility].    A voicemail was unable to be left with a call back number if the patient has questions or would like to reschedule.

## 2025-05-07 NOTE — PROGRESS NOTES
"INITIAL NEUROLOGY CONSULTATION    DATE OF VISIT: 5/8/2025  CLINIC LOCATION: [facility]  MRN: [id number]  PATIENT NAME: Nadia Jaimes  YOB: 1995    REASON FOR VISIT: No chief complaint on file.    HISTORY OF PRESENT ILLNESS:                                                      Nadia Jaimes is 29 year old {RIGHT/LEFT/AMBI:[id number]::\"right handed\"} adult patient with past medical history of ***, who was seen today for ***.    Per patient's report, ***.    ***    No additional useful information is available in Care Everywhere, which was reviewed.  PAST MEDICAL/SURGICAL HISTORY:                                                    I personally reviewed patient's past medical and surgical history with the patient at today's visit.  Patient Active Problem List   Diagnosis    Severe recurrent major depression without psychotic features (H)    Eating disorder, unspecified type    Gastroesophageal reflux disease without esophagitis    Autism    CORY (generalized anxiety disorder)    Cellulitis and abscess of leg     No past medical history on file.  MEDICATIONS:                                                    I personally reviewed patient's medications and allergies with the patient at today's visit.  Current Outpatient Medications   Medication Sig Dispense Refill    buPROPion (WELLBUTRIN XL) 150 MG 24 hr tablet Take 1 tablet (150 mg) by mouth every morning. 90 tablet 0    busPIRone (BUSPAR) 10 MG tablet 1 po bid. You may take an extra 5-10 mg on anxious days. 200 tablet 3    esomeprazole (NEXIUM) 40 MG DR capsule Take 1 capsule (40 mg) by mouth 2 times daily. Take 30-60 minutes before eating. 90 capsule 0    omeprazole (PRILOSEC) 40 MG DR capsule Take 1 capsule (40 mg) by mouth 2 times daily. 120 capsule 1    testosterone enanthate (DELATESTRYL) 200 MG/ML [facility]: 0.2ml subcutaneous weekly       Current Facility-Administered Medications   Medication " Dose Route Frequency Provider Last Rate Last Admin    testosterone enanthate (DELATESTRYL) injection 50 mg  50 mg Intramuscular Once          ALLERGIES:                                                      Allergies   Allergen Reactions    Penicillins Other (See Comments)   .    EXAM:                                                    VITAL SIGNS:   There were no vitals taken for this visit.  Mini-Cog Assessment:       General: pt is in NAD, cooperative.  Skin: normal turgor, moist mucous membranes, no lesions/rashes noticed.  HEENT: ATNC, EOMI, PERRL, white sclera, normal conjunctiva, no nystagmus or ptosis. No carotid bruits bilaterally.  Respiratory: lung sounds clear to auscultation bilaterally, no crackles, wheezes, rhonchi. Symmetric lung excursion, no accessory respiratory muscle use.  Cardiovascular: normal S1/S2, no murmurs/rubs/gallops.   Abdomen: Not distended.  : deferred.    Neurological:  Mental: alert, follows commands,  /5 with ***/3 on memory recall, no aphasia or dysarthria. Fund of knowledge is {MYAPPROPRIATE:744376}  Cranial Nerves:  CN II: visual acuity - able to accurately count fingers with each eye. Visual fields intact, fundi: discs sharp, no papilledema and normal vessels bilaterally.  CN III, IV, VI: EOM intact, pupils equal and reactive  CN V: facial sensation nl  CN VII: face symmetric, no facial droop  CN VIII: hearing normal  CN IX: palate elevation symmetric, uvula at midline  CN XI SCM normal, shoulder shrug nl  CN XII: tongue midline  Motor: Strength: 5/5 in all major groups of all extremities. Normal tone. No abnormal movements. No pronator drift b/l.  Reflexes: Triceps, biceps, brachioradialis, patellar, and achilles reflexes normal and symmetric. No clonus noted. Toes are down-going b/l.   Sensory: light touch, pinprick, and vibration intact. Romberg: negative.  Coordination: FNF and heel-shin tests intact b/l.   Gait:  Normal, able to tandem walk *** without difficulty.  DATA:    LABS/EEG/IMAGING/OTHER STUDIES: I reviewed pertinent medical records, as detailed in the history of present illness.  ASSESSMENT AND PLAN:      ASSESSMENT: Nadia Jaimes is a 29 year old adult patient with listed above past medical history, who presents with ***.    We had a detailed discussion with the patient regarding Keny's presenting complaints.  The neurological exam today is ***.    DIAGNOSES:  No diagnosis found.  PLAN: There are no Patient Instructions on file for this visit.    Total Time: *** minutes spent on the date of the encounter doing chart review, history and exam, documentation and further activities per the note.    Meek Harrison MD  [facility]  (Chart documentation was completed in part with Dragon voice-recognition software. Even though reviewed, some grammatical, spelling, and word errors may remain.)        INITIAL NEUROLOGY CONSULTATION    DATE OF VISIT: 5/8/2025  CLINIC LOCATION: [facility]  MRN: [id number]  PATIENT NAME: Nadia Jaimes  YOB: 1995    REASON FOR VISIT: No chief complaint on file.    HISTORY OF PRESENT ILLNESS:                                                      Nadia Jaimes is 29 year old right handed adult patient with past medical history of depression, autism, anxiety, and eating disorder, who was seen today for bilateral upper extremity paresthesia and decreased coordination.    Per patient's report, symptoms started *** ago.  Developed intermittent numbness and tingling from the shoulders to the fingertips upon laying down.  Feels that coordination is affected in upper and lower extremities.  Walks into things.  Hands have a lot of pain and stiffness.  Denies any additional focal neurological symptoms.  On Wellbutrin and BuSpar.    Laboratory evaluation from October 2024 includes unremarkable CMP, normal LDL (92), elevated TSH (5.91) with normal free T4 (1.11), normal CBC and reactive hep B  surface antibodies.    No prior brain/spine imaging.  No nerve conduction studies.    No additional useful information is available in Care Everywhere, which was reviewed.  PAST MEDICAL/SURGICAL HISTORY:                                                    I personally reviewed patient's past medical and surgical history with the patient at today's visit.  MEDICATIONS:                                                    I personally reviewed patient's medications and allergies with the patient at today's visit.  ALLERGIES:                                                       Allergies   Allergen Reactions    Penicillins Other (See Comments)     EXAM:                                                    VITAL SIGNS:   There were no vitals taken for this visit.  Mini-Cog Assessment:       General: pt is in NAD, cooperative.  Skin: normal turgor, moist mucous membranes, no lesions/rashes noticed.  HEENT: ATNC, EOMI, PERRL, white sclera, normal conjunctiva, no nystagmus or ptosis. No carotid bruits bilaterally.  Respiratory: lung sounds clear to auscultation bilaterally, no crackles, wheezes, rhonchi. Symmetric lung excursion, no accessory respiratory muscle use.  Cardiovascular: normal S1/S2, no murmurs/rubs/gallops.   Abdomen: Not distended.  : deferred.    Neurological:  Mental: alert, follows commands,  /5 with ***/3 on memory recall, no aphasia or dysarthria. Fund of knowledge is {MYAPPROPRIATE:[id number]}  Cranial Nerves:  CN II: visual acuity - able to accurately count fingers with each eye. Visual fields intact, fundi: discs sharp, no papilledema and normal vessels bilaterally.  CN III, IV, VI: EOM intact, pupils equal and reactive  CN V: facial sensation nl  CN VII: face symmetric, no facial droop  CN VIII: hearing normal  CN IX: palate elevation symmetric, uvula at midline  CN XI SCM normal, shoulder shrug nl  CN XII: tongue midline  Motor: Strength: 5/5 in all major groups of all extremities. Normal tone. No  abnormal movements. No pronator drift b/l.  Reflexes: Triceps, biceps, brachioradialis, patellar, and achilles reflexes normal and symmetric. No clonus noted. Toes are down-going b/l.   Sensory: light touch, pinprick, and vibration intact. Romberg: negative.  Coordination: FNF and heel-shin tests intact b/l.   Gait:  Normal, able to tandem walk *** without difficulty.  DATA:   LABS/EEG/IMAGING/OTHER STUDIES: I reviewed pertinent medical records, as detailed in the history of present illness.  ASSESSMENT AND PLAN:      ASSESSMENT: Nadia Jaimes is a 29 year old adult patient with listed above past medical history, who presents with ***.    We had a detailed discussion with the patient regarding Keny's presenting complaints.  The neurological exam today is ***.    DIAGNOSES:  No diagnosis found.  PLAN: There are no Patient Instructions on file for this visit.    Total Time: *** minutes spent on the date of the encounter doing chart review, history and exam, documentation and further activities per the note.    Meek Harrison MD  [facility]  (Chart documentation was completed in part with Dragon voice-recognition software. Even though reviewed, some grammatical, spelling, and word errors may remain.)

## 2025-05-08 ENCOUNTER — OFFICE VISIT (OUTPATIENT)
Dept: NEUROLOGY | Facility: CLINIC | Age: 30
End: 2025-05-08
Attending: FAMILY MEDICINE
Payer: COMMERCIAL

## 2025-05-08 ENCOUNTER — PRE VISIT (OUTPATIENT)
Dept: NEUROLOGY | Facility: CLINIC | Age: 30
End: 2025-05-08

## 2025-05-08 VITALS — DIASTOLIC BLOOD PRESSURE: 74 MMHG | HEART RATE: 88 BPM | SYSTOLIC BLOOD PRESSURE: 130 MMHG | OXYGEN SATURATION: 98 %

## 2025-05-08 DIAGNOSIS — R27.8 DECREASED COORDINATION: ICD-10-CM

## 2025-05-08 DIAGNOSIS — R20.2 NUMBNESS AND TINGLING OF BOTH UPPER EXTREMITIES: ICD-10-CM

## 2025-05-08 DIAGNOSIS — R51.9 HEADACHE DISORDER: ICD-10-CM

## 2025-05-08 DIAGNOSIS — R41.9 COGNITIVE COMPLAINTS: Primary | ICD-10-CM

## 2025-05-08 DIAGNOSIS — R20.0 NUMBNESS AND TINGLING OF BOTH UPPER EXTREMITIES: ICD-10-CM

## 2025-05-08 ASSESSMENT — MONTREAL COGNITIVE ASSESSMENT (MOCA)
11. FOR EACH PAIR OF WORDS, WHAT CATEGORY DO THEY BELONG TO (OUT OF 2): 2
WHAT IS THE TOTAL SCORE (OUT OF 30): 27
4. NAME EACH OF THE THREE ANIMALS SHOWN: 3
7. [VIGILENCE] TAP WHEN HEARING DESIGNATED LETTER: 1
WHAT LEVEL OF EDUCATION WAS ATTAINED: 1
10. [FLUENCY] NAME WORDS STARTING WITH DESIGNATED LETTER: 1
VISUOSPATIAL/EXECUTIVE SUBSCORE: 4
6. READ LIST OF DIGITS [FORWARD/BACKWARD]: 2
13. ORIENTATION SUBSCORE: 6
8. SERIAL SUBTRACTION OF 7S: 3
9. REPEAT EACH SENTENCE: 2
12. MEMORY INDEX SCORE: 2

## 2025-05-08 NOTE — PROGRESS NOTES
"INITIAL NEUROLOGY CONSULTATION    DATE OF VISIT: 5/8/2025  CLINIC LOCATION: [facility]  MRN: [id number]  PATIENT NAME: Nadia Jaimes  YOB: 1995    REASON FOR VISIT:   Chief Complaint   Patient presents with    Consult     Numbness and tingling in upper extremities  Cognitive issue, trouble with remembering things, dates, conversations.  headaches     HISTORY OF PRESENT ILLNESS:                                                      Nadia/Keny Jaimes is 29 year old right handed adult patient with past medical history of depression, autism, anxiety, PTSD, and eating disorder, who was seen today for cognitive complaints, headaches, bilateral upper extremity paresthesia and decreased coordination.  Accompanied by the partner, Kenrick.    Per patient's report, symptoms started approximately 3 to 4 years ago with the pain and numbness around left shoulder that eventually spread to involve both upper extremities from the shoulders down, including both hands, in form of intermittent numbness and tingling that occur daily on and off lasting for few minutes to half an hour.  Then approximately 2 years ago, also developed constant cognitive issues/forgetfulness and \"head rush/pain in the entire head\", when tilted back or moved too quickly as well as during muscle tension.  Feels that coordination is affected in upper and lower extremities.  Walks into things.  Hands have a lot of pain and stiffness.  Symptoms are most pronounced when physically active or with strain.  Denies any additional focal neurological symptoms.      Experienced head injury during childhood that led to headaches that resolved in 1 year.  No history of other prior significant head injuries, seizures, CNS infections, or strokes.    Laboratory evaluation from October 2024 includes unremarkable CMP, normal LDL (92), elevated TSH (5.91) with normal free T4 (1.11), normal CBC and reactive hep B surface " antibodies.    No prior brain/spine imaging.  No nerve conduction studies.    No additional useful information is available in Care Everywhere, which was reviewed.  PAST MEDICAL/SURGICAL HISTORY:                                                    I personally reviewed patient's past medical and surgical history with the patient at today's visit.  MEDICATIONS:                                                    I personally reviewed patient's medications and allergies with the patient at today's visit.  ALLERGIES:                                                       Allergies   Allergen Reactions    Penicillins Other (See Comments)     EXAM:                                                    VITAL SIGNS:   /74   Pulse 88   SpO2 98%   Hubbell Cognitive Assessment:    Anupam Cognitive Assessment (MOCA)  Visuospatial/Executive : 4  Naming: 3  Attention - Digits: 2  Attention - Letters: 1  Attention - Subtraction: 3  Language - Repeat: 2  Language - Fluency : 1  Abstraction: 2  Delayed Recall: 2  Orientation: 6  Education: 1  MOCA Score: 27     Anupam Cognitive Assessment Score:  MOCA Score: 27/30.     General: pt is in NAD, cooperative.  Skin: normal turgor, moist mucous membranes, no lesions/rashes noticed.  HEENT: ATNC, EOMI, PERRL, white sclera, normal conjunctiva, no nystagmus or ptosis. No carotid bruits bilaterally.  Respiratory: lung sounds clear to auscultation bilaterally, no crackles, wheezes, rhonchi. Symmetric lung excursion, no accessory respiratory muscle use.  Cardiovascular: normal S1/S2, no murmurs/rubs/gallops.   Abdomen: Not distended.  : deferred.    Neurological:  Mental: alert, follows commands, MoCA as per above, no aphasia or dysarthria. Fund of knowledge is appropriate for age.  Cranial Nerves:  CN II: visual acuity - able to accurately count fingers with each eye. Visual fields intact, fundi: discs sharp, no papilledema and normal vessels bilaterally.  CN III, IV, VI: EOM intact,  pupils equal and reactive  CN V: facial sensation nl  CN VII: face symmetric, no facial droop  CN VIII: hearing normal  CN IX: palate elevation symmetric, uvula at midline  CN XI SCM normal, shoulder shrug nl  CN XII: tongue midline  Motor: Strength: 5/5 in all major groups of all extremities. Normal tone. No abnormal movements. No pronator drift b/l.  Reflexes: Triceps, biceps, brachioradialis, patellar, and achilles reflexes normal and symmetric. No clonus noted. Toes are down-going b/l.   Sensory: light touch/pinprick is reduced in the left C8 dermatome, vibration intact. Romberg: negative.  Coordination: FNF and heel-shin tests intact b/l.   Gait:  Normal, able to tandem walk without difficulty.  DATA:   LABS/EEG/IMAGING/OTHER STUDIES: I reviewed pertinent medical records, as detailed in the history of present illness.  ASSESSMENT AND PLAN:      ASSESSMENT: Nadia/Keny Jaimes is a 29 year old adult patient with listed above past medical history, who presents with constellation of symptoms, including cognitive complaints, headaches, bilateral upper extremity paresthesia and incoordination.    We had a detailed discussion with the patient and the partner regarding Keny's presenting complaints.  The neurological exam today is only noticeable for reduced pinprick sensation in the left C8 dermatome.      We discussed that the clinical presentation might be consistent with bilateral cervical radiculopathy, lesions of the spinal cord, including demyelinating, or the brain, as well as focal entrapment neuropathies for bilateral upper extremity paresthesias.  We decided to start with brain and cervical spine MRI with and without contrast, but also discussed EMG if initial evaluation is unrevealing.    Regarding cognitive complaints, MoCA today is 27/30 (within normal limits), we will perform brain MRI to check for structural abnormalities (as stated above), but discussed that complaints might be potentially  attributable to the mental health conditions in absence of brain abnormalities on the MRI.    DIAGNOSES:    ICD-10-CM    1. Cognitive complaints  R41.9       2. Numbness and tingling of both upper extremities  R20.0 Adult Neurology  Referral    R20.2       3. Decreased coordination  R27.8 Adult Neurology  Referral      4. Headache disorder  R51.9         PLAN: At today's visit we thoroughly discussed various diagnostic possibilities for patient's symptoms, necessary evaluation, and the plan, which includes:  Orders Placed This Encounter   Procedures    MR Brain w/o & w Contrast    MR Cervical Spine w/o & w Contrast     No new medications.     Additional recommendations after the work-up.    Next follow-up appointment is in the next 2-4 weeks or earlier if needed.    Total Time: 63 minutes spent on the date of the encounter doing chart review, history and exam, documentation and further activities per the note.    Meek Harrison MD  [facility]  (Chart documentation was completed in part with Dragon voice-recognition software. Even though reviewed, some grammatical, spelling, and word errors may remain.)

## 2025-05-08 NOTE — PATIENT INSTRUCTIONS
AFTER VISIT SUMMARY (AVS):    At today's visit we thoroughly discussed various diagnostic possibilities for your symptoms, necessary evaluation, and the plan, which includes:  Orders Placed This Encounter   Procedures    MR Brain w/o & w Contrast    MR Cervical Spine w/o & w Contrast     No new medications.     Additional recommendations after the work-up.    Next follow-up appointment is in the next 2-4 weeks or earlier if needed.    Please do not hesitate to call me with any questions or concerns.    Thanks.

## 2025-05-08 NOTE — LETTER
"5/8/2025      Nadia Jaimes  [address]  [facility] [address]      Dear Colleague,    Thank you for referring your patient, Nadia Jaimes, to the [facility]. Please see a copy of my visit note below.    INITIAL NEUROLOGY CONSULTATION    DATE OF VISIT: 5/8/2025  CLINIC LOCATION: [facility]  MRN: [id number]  PATIENT NAME: Nadia Jaimes  YOB: 1995    REASON FOR VISIT:   Chief Complaint   Patient presents with     Consult     Numbness and tingling in upper extremities  Cognitive issue, trouble with remembering things, dates, conversations.  headaches     HISTORY OF PRESENT ILLNESS:                                                      Nadia/Keny Jaimes is 29 year old right handed adult patient with past medical history of depression, autism, anxiety, PTSD, and eating disorder, who was seen today for cognitive complaints, headaches, bilateral upper extremity paresthesia and decreased coordination.  Accompanied by the partner, Kenrick.    Per patient's report, symptoms started approximately 3 to 4 years ago with the pain and numbness around left shoulder that eventually spread to involve both upper extremities from the shoulders down, including both hands, in form of intermittent numbness and tingling that occur daily on and off lasting for few minutes to half an hour.  Then approximately 2 years ago, also developed constant cognitive issues/forgetfulness and \"head rush/pain in the entire head\", when tilted back or moved too quickly as well as during muscle tension.  Feels that coordination is affected in upper and lower extremities.  Walks into things.  Hands have a lot of pain and stiffness.  Symptoms are most pronounced when physically active or with strain.  Denies any additional focal neurological symptoms.      Experienced head injury during childhood that led to headaches that resolved in 1 year.  No history of other prior " significant head injuries, seizures, CNS infections, or strokes.    Laboratory evaluation from October 2024 includes unremarkable CMP, normal LDL (92), elevated TSH (5.91) with normal free T4 (1.11), normal CBC and reactive hep B surface antibodies.    No prior brain/spine imaging.  No nerve conduction studies.    No additional useful information is available in Care Everywhere, which was reviewed.  PAST MEDICAL/SURGICAL HISTORY:                                                    I personally reviewed patient's past medical and surgical history with the patient at today's visit.  MEDICATIONS:                                                    I personally reviewed patient's medications and allergies with the patient at today's visit.  ALLERGIES:                                                       Allergies   Allergen Reactions     Penicillins Other (See Comments)     EXAM:                                                    VITAL SIGNS:   /74   Pulse 88   SpO2 98%   [place] Cognitive Assessment:    [place] Cognitive Assessment (MOCA)  Visuospatial/Executive : 4  Naming: 3  Attention - Digits: 2  Attention - Letters: 1  Attention - Subtraction: 3  Language - Repeat: 2  Language - Fluency : 1  Abstraction: 2  Delayed Recall: 2  Orientation: 6  Education: 1  MOCA Score: 27     [place] Cognitive Assessment Score:  MOCA Score: 27/30.     General: pt is in NAD, cooperative.  Skin: normal turgor, moist mucous membranes, no lesions/rashes noticed.  HEENT: ATNC, EOMI, PERRL, white sclera, normal conjunctiva, no nystagmus or ptosis. No carotid bruits bilaterally.  Respiratory: lung sounds clear to auscultation bilaterally, no crackles, wheezes, rhonchi. Symmetric lung excursion, no accessory respiratory muscle use.  Cardiovascular: normal S1/S2, no murmurs/rubs/gallops.   Abdomen: Not distended.  : deferred.    Neurological:  Mental: alert, follows commands, MoCA as per above, no aphasia or dysarthria. Fund of  knowledge is appropriate for age.  Cranial Nerves:  CN II: visual acuity - able to accurately count fingers with each eye. Visual fields intact, fundi: discs sharp, no papilledema and normal vessels bilaterally.  CN III, IV, VI: EOM intact, pupils equal and reactive  CN V: facial sensation nl  CN VII: face symmetric, no facial droop  CN VIII: hearing normal  CN IX: palate elevation symmetric, uvula at midline  CN XI SCM normal, shoulder shrug nl  CN XII: tongue midline  Motor: Strength: 5/5 in all major groups of all extremities. Normal tone. No abnormal movements. No pronator drift b/l.  Reflexes: Triceps, biceps, brachioradialis, patellar, and achilles reflexes normal and symmetric. No clonus noted. Toes are down-going b/l.   Sensory: light touch/pinprick is reduced in the left C8 dermatome, vibration intact. Romberg: negative.  Coordination: FNF and heel-shin tests intact b/l.   Gait:  Normal, able to tandem walk without difficulty.  DATA:   LABS/EEG/IMAGING/OTHER STUDIES: I reviewed pertinent medical records, as detailed in the history of present illness.  ASSESSMENT AND PLAN:      ASSESSMENT: Nadia/Keny Jaimes is a 29 year old adult patient with listed above past medical history, who presents with constellation of symptoms, including cognitive complaints, headaches, bilateral upper extremity paresthesia and incoordination.    We had a detailed discussion with the patient and the partner regarding Keny's presenting complaints.  The neurological exam today is only noticeable for reduced pinprick sensation in the left C8 dermatome.      We discussed that the clinical presentation might be consistent with bilateral cervical radiculopathy, lesions of the spinal cord, including demyelinating, or the brain, as well as focal entrapment neuropathies for bilateral upper extremity paresthesias.  We decided to start with brain and cervical spine MRI with and without contrast, but also discussed EMG if initial  evaluation is unrevealing.    Regarding cognitive complaints, MoCA today is 27/30 (within normal limits), we will perform brain MRI to check for structural abnormalities (as stated above), but discussed that complaints might be potentially attributable to the mental health conditions in absence of brain abnormalities on the MRI.    DIAGNOSES:    ICD-10-CM    1. Cognitive complaints  R41.9       2. Numbness and tingling of both upper extremities  R20.0 Adult Neurology  Referral    R20.2       3. Decreased coordination  R27.8 Adult Neurology  Referral      4. Headache disorder  R51.9         PLAN: At today's visit we thoroughly discussed various diagnostic possibilities for patient's symptoms, necessary evaluation, and the plan, which includes:  Orders Placed This Encounter   Procedures     MR Brain w/o & w Contrast     MR Cervical Spine w/o & w Contrast     No new medications.     Additional recommendations after the work-up.    Next follow-up appointment is in the next 2-4 weeks or earlier if needed.    Total Time: 63 minutes spent on the date of the encounter doing chart review, history and exam, documentation and further activities per the note.    Meek Harrison MD  [facility] Neurology  (Chart documentation was completed in part with Dragon voice-recognition software. Even though reviewed, some grammatical, spelling, and word errors may remain.)            Again, thank you for allowing me to participate in the care of your patient.        Sincerely,        Meek Harrison MD    Electronically signed

## 2025-05-13 ENCOUNTER — OFFICE VISIT (OUTPATIENT)
Dept: GASTROENTEROLOGY | Facility: CLINIC | Age: 30
End: 2025-05-13
Attending: PHYSICIAN ASSISTANT
Payer: COMMERCIAL

## 2025-05-13 VITALS
HEART RATE: 75 BPM | BODY MASS INDEX: 28.17 KG/M2 | OXYGEN SATURATION: 100 % | SYSTOLIC BLOOD PRESSURE: 110 MMHG | WEIGHT: 165 LBS | HEIGHT: 64 IN | DIASTOLIC BLOOD PRESSURE: 71 MMHG

## 2025-05-13 DIAGNOSIS — K21.9 GASTROESOPHAGEAL REFLUX DISEASE, UNSPECIFIED WHETHER ESOPHAGITIS PRESENT: Primary | ICD-10-CM

## 2025-05-13 PROCEDURE — 1125F AMNT PAIN NOTED PAIN PRSNT: CPT | Performed by: PHYSICIAN ASSISTANT

## 2025-05-13 PROCEDURE — 99214 OFFICE O/P EST MOD 30 MIN: CPT | Performed by: PHYSICIAN ASSISTANT

## 2025-05-13 PROCEDURE — 3078F DIAST BP <80 MM HG: CPT | Performed by: PHYSICIAN ASSISTANT

## 2025-05-13 PROCEDURE — 3074F SYST BP LT 130 MM HG: CPT | Performed by: PHYSICIAN ASSISTANT

## 2025-05-13 RX ORDER — OMEPRAZOLE 40 MG/1
40 CAPSULE, DELAYED RELEASE ORAL 2 TIMES DAILY
Qty: 120 CAPSULE | Refills: 1 | Status: CANCELLED | OUTPATIENT
Start: 2025-05-13

## 2025-05-13 ASSESSMENT — PAIN SCALES - GENERAL: PAINLEVEL_OUTOF10: MODERATE PAIN (6)

## 2025-05-13 NOTE — PROGRESS NOTES
FOLLOW UP GI CLINIC VISIT    CC/REFERRING MD:  Rae Morillo  REASON FOR FOLLOW UP: GERD    ASSESSMENT/PLAN:  29 year old adult with GERD and epigastric pain. Started after excessive NSAID use 2-3 years ago. Evaluation in [place] in 2023 with H pylori testing and EGD was unremarkable. Nexium resulted in partial symptomatic improvement, but they get severe symptom recurrence when trying to stop PPI therapy. Recently switched to omeprazole 40 mg BID with improved epigastric pain but ongoing acid reflux and regurgitation. We discussed further evaluation with esophagram and EGD with Bravo. They are amenable to proceed. Discussed doing Bravo study off vs on PPI therapy and through shared decision making, will plan to do on PPI therapy given severe symptom recurrence upon stopping PPI and to also define whether persistent symptoms on PPI therapy are the result of pathologic GERD vs another etiology. Follow up after the above to review and discuss next steps.    -Continue omeprazole 40 mg BID.  -Okay to use Gaviscon at bedtime and PRN.  -Continue to avoid gastric irritants.  -Esophagram next available.  -EGD with Bravo next available.  -Follow up in 3 months or after the above.    Thank you for this consultation.  It was a pleasure to participate in the care of this patient; please contact us with any further questions.      31 minutes spent on the date of the encounter doing chart review, review of outside records, review of test results, interpretation of tests, patient visit, and documentation    This note was created with voice recognition software, and while reviewed for accuracy, typos may remain.     Rae Morillo PA-C  Division of Gastroenterology, Hepatology and Nutrition  [facility]  Nadia Jaimes is a 29 year old adult with a past medical history significant for GERD, anxiety, depression, and gender dysphoria who is seen for follow up in the GI  clinic today for GERD. I met with Keny in initial consultation on 3/11/2025 from which the following history was reviewed:    Keny is accompanied by their significant other, Kenrick, at today's appointment. They report developing significant stomach pain and GERD symptoms 2-3 years ago after taking a lot of chewable Tylenol (?ibuprofen). Symptoms have been persistent since then with acid reflux, heartburn, stomach pain which is worse after eating acidic foods, cough which is worse after eating, as well as acid taste in mouth/throat which is worse in the morning. They have taken Nexium for years which has helped in the past, but not currently providing adequate relief. Symptoms do get worse when they miss Nexium dose. Has not tried twice daily dosing. Will use Gaviscon on occasion which provides excellent relief of symptoms. Keny reports daily nausea which predates GERD symptoms, but reports infrequent vomiting. Attributes this to other health issues. They deny dysphagia, odynophagia, weight loss, or black, tarry stools. Bowels tend more toward constipation with a BM occurring a few times per week, sometimes less often. This is long-standing.      Keny underwent evaluation for these symptoms in [place] a few years ago. H pylori testing negative. EGD on 3/29/2023 was normal per review of scanned records (pathology results unavailable, but patient believes biopsies were normal). There is mention in faxed records of patient being advised to try rabeprazole, but Keny does not recall trying any PPIs other than esomeprazole. Has not tried H2RA therapy.     On that date, Nexium was stopped and omeprazole 40 mg twice daily was started.     TODAY   5/13/2025:  Keny returns accompanied by their partner, Kenrick.  They report improvement in stomach pain with twice daily omeprazole, but continue to experience acid reflux and a new symptom of regurgitation. They continue to deny dysphagia.   They take Gaviscon on  an as needed basis which does help.     PREVIOUS ENDOSCOPY:  March 29, 2023 - EGD:    No record of histology results, but patient believes pathology was normal.    PROBLEM LIST  Patient Active Problem List    Diagnosis Date Noted    CORY (generalized anxiety disorder) 02/11/2025     Priority: Medium    Cellulitis and abscess of leg 02/11/2025     Priority: Medium    Severe recurrent major depression without psychotic features (H) 01/03/2025     Priority: Medium    Eating disorder, unspecified type 01/03/2025     Priority: Medium    Gastroesophageal reflux disease without esophagitis 01/03/2025     Priority: Medium    Autism 01/03/2025     Priority: Medium       PERTINENT PAST MEDICAL HISTORY:  No past medical history on file.    PREVIOUS SURGERIES:  Past Surgical History:   Procedure Laterality Date    AS ESOPHAGOSCOPY, DIAGNOSTIC  03/29/2023    All normal       ALLERGIES:     Allergies   Allergen Reactions    Penicillins Other (See Comments)       PERTINENT MEDICATIONS:    Current Outpatient Medications:     busPIRone (BUSPAR) 10 MG tablet, 1 po bid. You may take an extra 5-10 mg on anxious days., Disp: 200 tablet, Rfl: 3    omeprazole (PRILOSEC) 40 MG DR capsule, Take 1 capsule (40 mg) by mouth 2 times daily., Disp: 120 capsule, Rfl: 1    testosterone enanthate (DELATESTRYL) 200 MG/ML injection, [facility]: 0.2ml subcutaneous weekly, Disp: , Rfl:     buPROPion (WELLBUTRIN XL) 150 MG 24 hr tablet, Take 1 tablet (150 mg) by mouth every morning. (Patient not taking: Reported on 5/13/2025), Disp: 90 tablet, Rfl: 0    Current Facility-Administered Medications:     testosterone enanthate (DELATESTRYL) injection 50 mg, 50 mg, Intramuscular, Once,     SOCIAL HISTORY:  Social History     Socioeconomic History    Marital status: Legally      Spouse name: Not on file    Number of children: Not on file    Years of education: Not on file    Highest education level: Not on file   Occupational History     Not on file   Tobacco Use    Smoking status: Never     Passive exposure: Never    Smokeless tobacco: Never   Vaping Use    Vaping status: Never Used   Substance and Sexual Activity    Alcohol use: Not Currently    Drug use: Not Currently    Sexual activity: Not on file   Other Topics Concern    Not on file   Social History Narrative    Not on file     Social Drivers of Health     Financial Resource Strain: Low Risk  (3/3/2025)    Financial Resource Strain     Within the past 12 months, have you or your family members you live with been unable to get utilities (heat, electricity) when it was really needed?: No   Food Insecurity: Low Risk  (3/3/2025)    Food Insecurity     Within the past 12 months, did you worry that your food would run out before you got money to buy more?: No     Within the past 12 months, did the food you bought just not last and you didn t have money to get more?: No   Transportation Needs: Low Risk  (3/3/2025)    Transportation Needs     Within the past 12 months, has lack of transportation kept you from medical appointments, getting your medicines, non-medical meetings or appointments, work, or from getting things that you need?: No   Physical Activity: Inactive (3/3/2025)    Exercise Vital Sign     Days of Exercise per Week: 0 days     Minutes of Exercise per Session: 0 min   Stress: Stress Concern Present (3/3/2025)    [place] [employer] - Occupational Stress Questionnaire     Feeling of Stress : Very much   Social Connections: Unknown (3/3/2025)    Social Connection and Isolation Panel [NHANES]     Frequency of Communication with Friends and Family: Not on file     Frequency of Social Gatherings with Friends and Family: Once a week     Attends Hindu Services: Not on file     Active Member of Clubs or Organizations: Not on file     Attends Club or Organization Meetings: Not on file     Marital Status: Not on file   Interpersonal Safety: Low Risk  (2/3/2025)     "Interpersonal Safety     Do you feel physically and emotionally safe where you currently live?: Yes     Within the past 12 months, have you been hit, slapped, kicked or otherwise physically hurt by someone?: No     Within the past 12 months, have you been humiliated or emotionally abused in other ways by your partner or ex-partner?: No   Housing Stability: Low Risk  (3/3/2025)    Housing Stability     Do you have housing? : Yes     Are you worried about losing your housing?: No       FAMILY HISTORY:  Family History   Problem Relation Age of Onset    Coronary Artery Disease Mother 40        two small heart attacks    Other Problems  (estranged) Father     No Known Problems Half-sibling        Past/family/social history reviewed and no changes    PHYSICAL EXAMINATION:  Vitals /71   Pulse 75   Ht 1.626 m (5' 4\")   Wt 74.8 kg (165 lb)   SpO2 100%   BMI 28.32 kg/m     Wt   Wt Readings from Last 2 Encounters:   05/13/25 74.8 kg (165 lb)   03/11/25 75.3 kg (166 lb 1.6 oz)      Gen: Resting comfortably in an exam chair, alert, no distress  Eyes: sclerae anicteric  ENT:  OP clear, MMM  Resp: No increased respiratory effort  Skin: Visible skin clear. No significant rash, abnormal pigmentation or lesions.  Neuro: Cranial nerves grossly intact.  Mentation and speech appropriate for age.  Psych: Appropriate affect, tone, and pace of words    PERTINENT STUDIES:  Reviewed      "

## 2025-05-13 NOTE — PATIENT INSTRUCTIONS
It was a pleasure meeting with you today and discussing your healthcare plan. Below is a summary of what we covered:    Schedule esophagram x-ray next available.    Schedule an upper endoscopy with Bravo (pH study) next available.    Follow up in 3 months, or after the above to review and discuss next steps.    Continue omeprazole 40 mg twice daily for now. Will likely continue through the Bravo study.    Okay to use Gaviscon as needed.    Continue to avoid stomach irritants including NSAID medications like ibuprofen (Advil), naproxen (Aleve), tobacco, alcohol, excessive spicy/acidic foods, etc.    Please see below for any additional questions and scheduling guidelines.    Sign up for [employer]: [employer] patient portal serves as a secure platform for accessing your medical records from the [facility]. Additionally, [employer] facilitates easy, timely, and secure messaging with your care team. If you have not signed up, you may do so by using the provided code or calling [phone].    Coordinating your care after your visit:  There are multiple options for scheduling your follow-up care based on your provider's recommendation.    How do I schedule a follow-up clinic appointment:   After your appointment, you may receive scheduling assistance with the Clinic Coordinators by having a seat in the waiting room and a Clinic Coordinator will call you up to schedule.  Virtual visits or after you leave the clinic:  Your provider has placed a follow-up order in the [employer] portal for scheduling your return appointment. A member of the scheduling team will contact you to schedule.  [facility] Scheduling: Timely scheduling through [employer] is advised to ensure appointment availability.   Call to schedule: You may schedule your follow-up appointment(s) by calling [phone], option 1.    How do I schedule my endoscopy or colonoscopy procedure:  If a procedure, such as a colonoscopy or upper endoscopy was ordered by your  provider, the scheduling team will contact you to schedule this procedure. Or you may choose to call to schedule at   [phone], option 2.  Please allow 20-30 minutes when scheduling a procedure.    How do I get my blood work done? To get your blood work done, you need to schedule a lab appointment at an [facility]. There are multiple ways to schedule:   At the clinic: The Clinic Coordinator you meet after your visit can help you schedule a lab appointment.   [employer] scheduling: [employer] offers online lab scheduling at all [facility] laboratory locations.   Call to schedule: You can call [phone] to schedule your lab appointment.    How do I schedule my imaging study: To schedule imaging studies, such as CT scans, ultrasounds, MRIs, or X-rays, contact Imaging Services at [phone].    How do I schedule a referral to another doctor: If your provider recommended a referral to another specialist(s), the referral order was placed by your provider. You will receive a phone call to schedule this referral, or you may choose to call the number attached to the referral to self-schedule.    For Post-Visit Question(s):  For any inquiries following today's visit:  Please utilize [employer] messaging and allow 48 hours for reply or contact the Call Center during normal business hours at [phone], option 3.  For Emergent After-hours questions, contact the On-Call GI Fellow through the [facility]  at [phone].  In addition, you may contact your Nurse directly using the provided contact information.    Test Results:  Test results will be accessible via [employer] in compliance with the 21st Century Cures Act. This means that your results will be available to you at the same time as your provider. Often you may see your results before your provider does. Results are reviewed by staff within two weeks with communication follow-up. Results may be released in the patient portal  prior to your care team review.    Prescription Refill(s):  Medication prescribed by your provider will be addressed during your visit. For future refills, please coordinate with your pharmacy. If you have not had a recent clinic visit or routine labs, for your safety, your provider may not be able to refill your prescription.

## 2025-05-13 NOTE — NURSING NOTE
"Chief Complaint   Patient presents with    Follow Up     GERD; Epigastric pain     Keny requests these members of Keny's care team be copied on today's visit information:  PCP: Ellie Painter    Referring Provider:  Rae Morillo PA-C  [address] [place] [id number]    Vitals:    05/13/25 1311   BP: 110/71   Pulse: 75   SpO2: 100%   Weight: 74.8 kg (165 lb)   Height: 1.626 m (5' 4\")     Body mass index is 28.32 kg/m .    Medications were reconciled.    Does patient need any medication refills at today's visit? Yes, Omeprazole    Fay Bell, Clinical Assistant      "

## 2025-05-13 NOTE — LETTER
5/13/2025      Nadia Jaimes  [address]  [facility] [address]      Dear Colleague,    Thank you for referring your patient, Nadia Jaimes, to the [facility]. Please see a copy of my visit note below.    FOLLOW UP GI CLINIC VISIT    CC/REFERRING MD:  Rae Morillo  REASON FOR FOLLOW UP: GERD    ASSESSMENT/PLAN:  29 year old adult with GERD and epigastric pain. Started after excessive NSAID use 2-3 years ago. Evaluation in [place] in 2023 with H pylori testing and EGD was unremarkable. Nexium resulted in partial symptomatic improvement, but they get severe symptom recurrence when trying to stop PPI therapy. Recently switched to omeprazole 40 mg BID with improved epigastric pain but ongoing acid reflux and regurgitation. We discussed further evaluation with esophagram and EGD with Bravo. They are amenable to proceed. Discussed doing Bravo study off vs on PPI therapy and through shared decision making, will plan to do on PPI therapy given severe symptom recurrence upon stopping PPI and to also define whether persistent symptoms on PPI therapy are the result of pathologic GERD vs another etiology. Follow up after the above to review and discuss next steps.    -Continue omeprazole 40 mg BID.  -Okay to use Gaviscon at bedtime and PRN.  -Continue to avoid gastric irritants.  -Esophagram next available.  -EGD with Bravo next available.  -Follow up in 3 months or after the above.    Thank you for this consultation.  It was a pleasure to participate in the care of this patient; please contact us with any further questions.      31 minutes spent on the date of the encounter doing chart review, review of outside records, review of test results, interpretation of tests, patient visit, and documentation    This note was created with voice recognition software, and while reviewed for accuracy, typos may remain.     Rae Morillo PA-C  Division of Gastroenterology, Hepatology and  [facility]  Nadia Jaimes is a 29 year old adult with a past medical history significant for GERD, anxiety, depression, and gender dysphoria who is seen for follow up in the GI clinic today for GERD. I met with Keny in initial consultation on 3/11/2025 from which the following history was reviewed:    Keny is accompanied by their significant other, Kenrick, at today's appointment. They report developing significant stomach pain and GERD symptoms 2-3 years ago after taking a lot of chewable Tylenol (?ibuprofen). Symptoms have been persistent since then with acid reflux, heartburn, stomach pain which is worse after eating acidic foods, cough which is worse after eating, as well as acid taste in mouth/throat which is worse in the morning. They have taken Nexium for years which has helped in the past, but not currently providing adequate relief. Symptoms do get worse when they miss Nexium dose. Has not tried twice daily dosing. Will use Gaviscon on occasion which provides excellent relief of symptoms. Keny reports daily nausea which predates GERD symptoms, but reports infrequent vomiting. Attributes this to other health issues. They deny dysphagia, odynophagia, weight loss, or black, tarry stools. Bowels tend more toward constipation with a BM occurring a few times per week, sometimes less often. This is long-standing.      Keny underwent evaluation for these symptoms in [place] a few years ago. H pylori testing negative. EGD on 3/29/2023 was normal per review of scanned records (pathology results unavailable, but patient believes biopsies were normal). There is mention in faxed records of patient being advised to try rabeprazole, but Keny does not recall trying any PPIs other than esomeprazole. Has not tried H2RA therapy.     On that date, Nexium was stopped and omeprazole 40 mg twice daily was started.     TODAY   5/13/2025:  Keny  returns accompanied by their partner, Kenrick.  They report improvement in stomach pain with twice daily omeprazole, but continue to experience acid reflux and a new symptom of regurgitation. They continue to deny dysphagia.   They take Gaviscon on an as needed basis which does help.     PREVIOUS ENDOSCOPY:  March 29, 2023 - EGD:    No record of histology results, but patient believes pathology was normal.    PROBLEM LIST  Patient Active Problem List    Diagnosis Date Noted     CORY (generalized anxiety disorder) 02/11/2025     Priority: Medium     Cellulitis and abscess of leg 02/11/2025     Priority: Medium     Severe recurrent major depression without psychotic features (H) 01/03/2025     Priority: Medium     Eating disorder, unspecified type 01/03/2025     Priority: Medium     Gastroesophageal reflux disease without esophagitis 01/03/2025     Priority: Medium     Autism 01/03/2025     Priority: Medium       PERTINENT PAST MEDICAL HISTORY:  No past medical history on file.    PREVIOUS SURGERIES:  Past Surgical History:   Procedure Laterality Date     AS ESOPHAGOSCOPY, DIAGNOSTIC  03/29/2023    All normal       ALLERGIES:     Allergies   Allergen Reactions     Penicillins Other (See Comments)       PERTINENT MEDICATIONS:    Current Outpatient Medications:      busPIRone (BUSPAR) 10 MG tablet, 1 po bid. You may take an extra 5-10 mg on anxious days., Disp: 200 tablet, Rfl: 3     omeprazole (PRILOSEC) 40 MG DR capsule, Take 1 capsule (40 mg) by mouth 2 times daily., Disp: 120 capsule, Rfl: 1     testosterone enanthate (DELATESTRYL) 200 MG/ML injection, [facility]: 0.2ml subcutaneous weekly, Disp: , Rfl:      buPROPion (WELLBUTRIN XL) 150 MG 24 hr tablet, Take 1 tablet (150 mg) by mouth every morning. (Patient not taking: Reported on 5/13/2025), Disp: 90 tablet, Rfl: 0    Current Facility-Administered Medications:      testosterone enanthate (DELATESTRYL) injection 50 mg, 50 mg, Intramuscular, Once,      SOCIAL HISTORY:  Social History     Socioeconomic History     Marital status: Legally      Spouse name: Not on file     Number of children: Not on file     Years of education: Not on file     Highest education level: Not on file   Occupational History     Not on file   Tobacco Use     Smoking status: Never     Passive exposure: Never     Smokeless tobacco: Never   Vaping Use     Vaping status: Never Used   Substance and Sexual Activity     Alcohol use: Not Currently     Drug use: Not Currently     Sexual activity: Not on file   Other Topics Concern     Not on file   Social History Narrative     Not on file     Social Drivers of Health     Financial Resource Strain: Low Risk  (3/3/2025)    Financial Resource Strain      Within the past 12 months, have you or your family members you live with been unable to get utilities (heat, electricity) when it was really needed?: No   Food Insecurity: Low Risk  (3/3/2025)    Food Insecurity      Within the past 12 months, did you worry that your food would run out before you got money to buy more?: No      Within the past 12 months, did the food you bought just not last and you didn t have money to get more?: No   Transportation Needs: Low Risk  (3/3/2025)    Transportation Needs      Within the past 12 months, has lack of transportation kept you from medical appointments, getting your medicines, non-medical meetings or appointments, work, or from getting things that you need?: No   Physical Activity: Inactive (3/3/2025)    Exercise Vital Sign      Days of Exercise per Week: 0 days      Minutes of Exercise per Session: 0 min   Stress: Stress Concern Present (3/3/2025)    [employer] - Occupational Stress Questionnaire      Feeling of Stress : Very much   Social Connections: Unknown (3/3/2025)    Social Connection and Isolation Panel [NHANES]      Frequency of Communication with Friends and Family: Not on file      Frequency of Social  "Gatherings with Friends and Family: Once a week      Attends Yarsani Services: Not on file      Active Member of Clubs or Organizations: Not on file      Attends Club or Organization Meetings: Not on file      Marital Status: Not on file   Interpersonal Safety: Low Risk  (2/3/2025)    Interpersonal Safety      Do you feel physically and emotionally safe where you currently live?: Yes      Within the past 12 months, have you been hit, slapped, kicked or otherwise physically hurt by someone?: No      Within the past 12 months, have you been humiliated or emotionally abused in other ways by your partner or ex-partner?: No   Housing Stability: Low Risk  (3/3/2025)    Housing Stability      Do you have housing? : Yes      Are you worried about losing your housing?: No       FAMILY HISTORY:  Family History   Problem Relation Age of Onset     Coronary Artery Disease Mother 40        two small heart attacks     Other Problems  (estranged) Father      No Known Problems Half-sibling        Past/family/social history reviewed and no changes    PHYSICAL EXAMINATION:  Vitals /71   Pulse 75   Ht 1.626 m (5' 4\")   Wt 74.8 kg (165 lb)   SpO2 100%   BMI 28.32 kg/m     Wt   Wt Readings from Last 2 Encounters:   05/13/25 74.8 kg (165 lb)   03/11/25 75.3 kg (166 lb 1.6 oz)      Gen: Resting comfortably in an exam chair, alert, no distress  Eyes: sclerae anicteric  ENT:  OP clear, MMM  Resp: No increased respiratory effort  Skin: Visible skin clear. No significant rash, abnormal pigmentation or lesions.  Neuro: Cranial nerves grossly intact.  Mentation and speech appropriate for age.  Psych: Appropriate affect, tone, and pace of words    PERTINENT STUDIES:  Reviewed        Again, thank you for allowing me to participate in the care of your patient.        Sincerely,        Rae Morillo PA-C    Electronically signed"

## 2025-05-19 ENCOUNTER — HOSPITAL ENCOUNTER (OUTPATIENT)
Dept: MRI IMAGING | Facility: CLINIC | Age: 30
Discharge: HOME | End: 2025-05-19
Attending: PSYCHIATRY & NEUROLOGY | Admitting: PSYCHIATRY & NEUROLOGY
Payer: COMMERCIAL

## 2025-05-19 ENCOUNTER — RESULTS FOLLOW-UP (OUTPATIENT)
Dept: NEUROLOGY | Facility: CLINIC | Age: 30
End: 2025-05-19

## 2025-05-19 DIAGNOSIS — R27.8 DECREASED COORDINATION: ICD-10-CM

## 2025-05-19 DIAGNOSIS — R51.9 HEADACHE DISORDER: ICD-10-CM

## 2025-05-19 DIAGNOSIS — R20.2 NUMBNESS AND TINGLING OF BOTH UPPER EXTREMITIES: ICD-10-CM

## 2025-05-19 DIAGNOSIS — R41.9 COGNITIVE COMPLAINTS: ICD-10-CM

## 2025-05-19 DIAGNOSIS — R20.0 NUMBNESS AND TINGLING OF BOTH UPPER EXTREMITIES: ICD-10-CM

## 2025-05-19 PROCEDURE — 255N000002 HC RX 255 OP 636: Performed by: PSYCHIATRY & NEUROLOGY

## 2025-05-19 PROCEDURE — 72156 MRI NECK SPINE W/O & W/DYE: CPT

## 2025-05-19 PROCEDURE — 70553 MRI BRAIN STEM W/O & W/DYE: CPT

## 2025-05-19 PROCEDURE — A9585 GADOBUTROL INJECTION: HCPCS | Performed by: PSYCHIATRY & NEUROLOGY

## 2025-05-19 RX ORDER — GADOBUTROL 604.72 MG/ML
0.1 INJECTION INTRAVENOUS ONCE
Status: COMPLETED | OUTPATIENT
Start: 2025-05-19 | End: 2025-05-19

## 2025-05-19 RX ADMIN — GADOBUTROL 7.48 ML: 604.72 INJECTION INTRAVENOUS at 09:16

## 2025-06-16 ENCOUNTER — OFFICE VISIT (OUTPATIENT)
Dept: NEUROLOGY | Facility: CLINIC | Age: 30
End: 2025-06-16
Payer: COMMERCIAL

## 2025-06-16 VITALS — DIASTOLIC BLOOD PRESSURE: 80 MMHG | HEART RATE: 77 BPM | OXYGEN SATURATION: 97 % | SYSTOLIC BLOOD PRESSURE: 126 MMHG

## 2025-06-16 DIAGNOSIS — R51.9 HEADACHE DISORDER: ICD-10-CM

## 2025-06-16 DIAGNOSIS — R20.2 NUMBNESS AND TINGLING OF BOTH UPPER EXTREMITIES: Primary | ICD-10-CM

## 2025-06-16 DIAGNOSIS — G93.5 CHIARI I MALFORMATION (H): ICD-10-CM

## 2025-06-16 DIAGNOSIS — R41.9 COGNITIVE COMPLAINTS: ICD-10-CM

## 2025-06-16 DIAGNOSIS — R20.0 NUMBNESS AND TINGLING OF BOTH UPPER EXTREMITIES: Primary | ICD-10-CM

## 2025-06-16 DIAGNOSIS — R27.8 DECREASED COORDINATION: ICD-10-CM

## 2025-06-16 PROCEDURE — 3074F SYST BP LT 130 MM HG: CPT | Performed by: PSYCHIATRY & NEUROLOGY

## 2025-06-16 PROCEDURE — 3079F DIAST BP 80-89 MM HG: CPT | Performed by: PSYCHIATRY & NEUROLOGY

## 2025-06-16 PROCEDURE — 99214 OFFICE O/P EST MOD 30 MIN: CPT | Performed by: PSYCHIATRY & NEUROLOGY

## 2025-06-16 NOTE — PATIENT INSTRUCTIONS
AFTER VISIT SUMMARY (AVS):    At today's visit we thoroughly discussed current symptoms, evaluation results, and the plan.    We decided to pursue additional testing, including additional brain MRI to check CSF flow, thoracic spine MRI, and EMG.    Next follow-up appointment is in the next 3 months or earlier if needed.    Please do not hesitate to call me with any questions or concerns.    Thanks.

## 2025-06-16 NOTE — NURSING NOTE
"Nadia Jaimes is a 29 year old adult who presents for:  Chief Complaint   Patient presents with    RECHECK     MRI follow up - Numbness and tingling of both upper extremities, Decreased coordination        Initial Vitals:  /80   Pulse 77   SpO2 97%  Estimated body mass index is 28.32 kg/m  as calculated from the following:    Height as of 5/13/25: 1.626 m (5' 4\").    Weight as of 5/13/25: 74.8 kg (165 lb).. There is no height or weight on file to calculate BSA. BP completed using cuff size: regular  Data Unavailable    Nursing Comments:       Jazlyn Wright    "

## 2025-06-16 NOTE — PROGRESS NOTES
ESTABLISHED PATIENT NEUROLOGY NOTE    DATE OF VISIT: 6/16/2025  CLINIC LOCATION: [facility]  MRN: [id number]  PATIENT NAME: Nadia Jaimes  YOB: 1995    REASON FOR VISIT:   Chief Complaint   Patient presents with    RECHECK     MRI follow up - Numbness and tingling of both upper extremities, Decreased coordination     SUBJECTIVE:                                                      HISTORY OF PRESENT ILLNESS: Patient is here to follow up regarding cognitive complaints, headaches, bilateral upper extremity paresthesia and decreased coordination. Please refer to my initial note from 5/8/2025 for further information.    Since the last visit, the patient reports no changes in symptoms.  Denies interval development of new focal neurological symptoms.    Brain MRI with and without contrast from 5/19/2025 demonstrated Chiari I malformation without other abnormalities.  Cervical spine MRI with and without contrast was negative for syrinx or cord abnormality.  Images were personally reviewed and independently interpreted.  EXAM:                                                    Physical Exam:   Vitals: /80   Pulse 77   SpO2 97%     General: pt is in NAD, cooperative.  Skin: normal turgor, moist mucous membranes, no lesions/rashes noticed.  HEENT: ATNC, white sclera, normal conjunctiva.  Respiratory: Symmetric lung excursion, no accessory respiratory muscle use.  Abdomen: Non distended.  Neurological: awake, cooperative, follows commands, no exam changes compared to the initial visit.  ASSESSMENT AND PLAN:                                                    Assessment: 29 year old adult patient presents for follow-up of cognitive complaints, headaches, bilateral upper extremity paresthesia and incoordination after completion of recommended workup.      We discussed the results, including finding of Chiari I malformation.  We discussed need for phase contrast sine study to evaluate  for obstruction of CSF flow and thoracic spine MRI for syrinx.      At the same time, we discussed that her old symptoms might not be explained by the Chiari I malformation, and I would recommend also doing an EMG to check for peripheral causes of her bilateral upper extremity paresthesia.    Diagnoses:    ICD-10-CM    1. Numbness and tingling of both upper extremities  R20.0 EMG    R20.2       2. Decreased coordination  R27.8 MR Brain w/o Contrast      3. Headache disorder  R51.9 MR Brain w/o Contrast      4. Cognitive complaints  R41.9       5. Chiari I malformation (H)  G93.5 MR Brain w/o Contrast     MR Thoracic Spine w/o Contrast        Plan: At today's visit we thoroughly discussed current symptoms, evaluation results, and the plan.    We decided to pursue additional testing, including additional brain MRI to check CSF flow, thoracic spine MRI, and EMG.    Next follow-up appointment is in the next 3 months or earlier if needed.    Total Time: 24 minutes spent on the date of the encounter doing chart review, history and exam, documentation and further activities per the note.    Meek Harrison MD  [facility] Neurology  (Chart documentation was completed in part with Dragon voice-recognition software. Even though reviewed, some grammatical, spelling, and word errors may remain.)

## 2025-06-16 NOTE — LETTER
6/16/2025      Nadia Jaimes  [address]  [facility] [address]      Dear Colleague,    Thank you for referring your patient, Nadia Jaimes, to the [facility] [place]. Please see a copy of my visit note below.    ESTABLISHED PATIENT NEUROLOGY NOTE    DATE OF VISIT: 6/16/2025  CLINIC LOCATION: [facility]  MRN: [id number]  PATIENT NAME: Nadia Jaimes  YOB: 1995    REASON FOR VISIT:   Chief Complaint   Patient presents with     RECHECK     MRI follow up - Numbness and tingling of both upper extremities, Decreased coordination     SUBJECTIVE:                                                      HISTORY OF PRESENT ILLNESS: Patient is here to follow up regarding cognitive complaints, headaches, bilateral upper extremity paresthesia and decreased coordination. Please refer to my initial note from 5/8/2025 for further information.    Since the last visit, the patient reports no changes in symptoms.  Denies interval development of new focal neurological symptoms.    Brain MRI with and without contrast from 5/19/2025 demonstrated Chiari I malformation without other abnormalities.  Cervical spine MRI with and without contrast was negative for syrinx or cord abnormality.  Images were personally reviewed and independently interpreted.  EXAM:                                                    Physical Exam:   Vitals: /80   Pulse 77   SpO2 97%     General: pt is in NAD, cooperative.  Skin: normal turgor, moist mucous membranes, no lesions/rashes noticed.  HEENT: ATNC, white sclera, normal conjunctiva.  Respiratory: Symmetric lung excursion, no accessory respiratory muscle use.  Abdomen: Non distended.  Neurological: awake, cooperative, follows commands, no exam changes compared to the initial visit.  ASSESSMENT AND PLAN:                                                    Assessment: 29 year old adult patient presents for follow-up of cognitive  complaints, headaches, bilateral upper extremity paresthesia and incoordination after completion of recommended workup.      We discussed the results, including finding of Chiari I malformation.  We discussed need for phase contrast sine study to evaluate for obstruction of CSF flow and thoracic spine MRI for syrinx.      At the same time, we discussed that her old symptoms might not be explained by the Chiari I malformation, and I would recommend also doing an EMG to check for peripheral causes of her bilateral upper extremity paresthesia.    Diagnoses:    ICD-10-CM    1. Numbness and tingling of both upper extremities  R20.0 EMG    R20.2       2. Decreased coordination  R27.8 MR Brain w/o Contrast      3. Headache disorder  R51.9 MR Brain w/o Contrast      4. Cognitive complaints  R41.9       5. Chiari I malformation (H)  G93.5 MR Brain w/o Contrast     MR Thoracic Spine w/o Contrast        Plan: At today's visit we thoroughly discussed current symptoms, evaluation results, and the plan.    We decided to pursue additional testing, including additional brain MRI to check CSF flow, thoracic spine MRI, and EMG.    Next follow-up appointment is in the next 3 months or earlier if needed.    Total Time: 24 minutes spent on the date of the encounter doing chart review, history and exam, documentation and further activities per the note.    Meek Harrison MD  [facility] Neurology  (Chart documentation was completed in part with Dragon voice-recognition software. Even though reviewed, some grammatical, spelling, and word errors may remain.)    Again, thank you for allowing me to participate in the care of your patient.        Sincerely,        Meek Harrison MD    Electronically signed

## 2025-09-04 ENCOUNTER — MYC REFILL (OUTPATIENT)
Dept: FAMILY MEDICINE | Facility: CLINIC | Age: 30
End: 2025-09-04
Payer: COMMERCIAL

## 2025-09-04 DIAGNOSIS — F41.1 GAD (GENERALIZED ANXIETY DISORDER): ICD-10-CM

## 2025-09-04 RX ORDER — BUSPIRONE HYDROCHLORIDE 10 MG/1
TABLET ORAL
Qty: 200 TABLET | Refills: 0 | Status: SHIPPED | OUTPATIENT
Start: 2025-09-04